# Patient Record
Sex: FEMALE | Race: WHITE | NOT HISPANIC OR LATINO | Employment: FULL TIME | URBAN - METROPOLITAN AREA
[De-identification: names, ages, dates, MRNs, and addresses within clinical notes are randomized per-mention and may not be internally consistent; named-entity substitution may affect disease eponyms.]

---

## 2017-10-30 ENCOUNTER — CONVERSION ENCOUNTER (OUTPATIENT)
Dept: GENERAL RADIOLOGY | Facility: HOSPITAL | Age: 52
End: 2017-10-30

## 2018-09-28 ENCOUNTER — OFFICE VISIT CONVERTED (OUTPATIENT)
Dept: FAMILY MEDICINE CLINIC | Facility: CLINIC | Age: 53
End: 2018-09-28
Attending: PHYSICIAN ASSISTANT

## 2019-09-23 ENCOUNTER — HOSPITAL ENCOUNTER (OUTPATIENT)
Dept: LAB | Facility: HOSPITAL | Age: 54
Discharge: HOME | End: 2019-09-23
Attending: PHYSICIAN ASSISTANT

## 2019-09-23 LAB
25(OH)D3 SERPL-MCNC: 30.4 NG/ML (ref 30–100)
ALBUMIN SERPL-MCNC: 4.5 G/DL (ref 3.5–5)
ALBUMIN/GLOB SERPL: 1.7 {RATIO} (ref 1.4–2.6)
ALP SERPL-CCNC: 82 U/L (ref 53–141)
ALT SERPL-CCNC: 16 U/L (ref 10–40)
ANION GAP SERPL CALC-SCNC: 22 MMOL/L (ref 8–19)
AST SERPL-CCNC: 18 U/L (ref 15–50)
BASOPHILS # BLD AUTO: 0.11 10*3/UL (ref 0–0.2)
BASOPHILS NFR BLD AUTO: 1.2 % (ref 0–3)
BILIRUB SERPL-MCNC: 0.3 MG/DL (ref 0.2–1.3)
BUN SERPL-MCNC: 9 MG/DL (ref 5–25)
BUN/CREAT SERPL: 10 {RATIO} (ref 6–20)
CALCIUM SERPL-MCNC: 8.7 MG/DL (ref 8.7–10.4)
CHLORIDE SERPL-SCNC: 100 MMOL/L (ref 99–111)
CHOLEST SERPL-MCNC: 219 MG/DL (ref 107–200)
CHOLEST/HDLC SERPL: 6.4 {RATIO} (ref 3–6)
CONV ABS IMM GRAN: 0.02 10*3/UL (ref 0–0.2)
CONV CO2: 24 MMOL/L (ref 22–32)
CONV IMMATURE GRAN: 0.2 % (ref 0–1.8)
CONV TOTAL PROTEIN: 7.2 G/DL (ref 6.3–8.2)
CREAT UR-MCNC: 0.86 MG/DL (ref 0.5–0.9)
DEPRECATED RDW RBC AUTO: 44.2 FL (ref 36.4–46.3)
EOSINOPHIL # BLD AUTO: 0.27 10*3/UL (ref 0–0.7)
EOSINOPHIL # BLD AUTO: 3 % (ref 0–7)
ERYTHROCYTE [DISTWIDTH] IN BLOOD BY AUTOMATED COUNT: 12.3 % (ref 11.7–14.4)
GFR SERPLBLD BASED ON 1.73 SQ M-ARVRAT: >60 ML/MIN/{1.73_M2}
GLOBULIN UR ELPH-MCNC: 2.7 G/DL (ref 2–3.5)
GLUCOSE SERPL-MCNC: 105 MG/DL (ref 65–99)
HCT VFR BLD AUTO: 41.6 % (ref 37–47)
HDLC SERPL-MCNC: 34 MG/DL (ref 40–60)
HGB BLD-MCNC: 13.8 G/DL (ref 12–16)
LDLC SERPL CALC-MCNC: 108 MG/DL (ref 70–100)
LYMPHOCYTES # BLD AUTO: 3.05 10*3/UL (ref 1–5)
LYMPHOCYTES NFR BLD AUTO: 33.6 % (ref 20–45)
MCH RBC QN AUTO: 32.3 PG (ref 27–31)
MCHC RBC AUTO-ENTMCNC: 33.2 G/DL (ref 33–37)
MCV RBC AUTO: 97.4 FL (ref 81–99)
MONOCYTES # BLD AUTO: 0.63 10*3/UL (ref 0.2–1.2)
MONOCYTES NFR BLD AUTO: 6.9 % (ref 3–10)
NEUTROPHILS # BLD AUTO: 5 10*3/UL (ref 2–8)
NEUTROPHILS NFR BLD AUTO: 55.1 % (ref 30–85)
NRBC CBCN: 0 % (ref 0–0.7)
OSMOLALITY SERPL CALC.SUM OF ELEC: 293 MOSM/KG (ref 273–304)
PLATELET # BLD AUTO: 286 10*3/UL (ref 130–400)
PMV BLD AUTO: 9.8 FL (ref 9.4–12.3)
POTASSIUM SERPL-SCNC: 3.5 MMOL/L (ref 3.5–5.3)
RBC # BLD AUTO: 4.27 10*6/UL (ref 4.2–5.4)
SODIUM SERPL-SCNC: 142 MMOL/L (ref 135–147)
T4 FREE SERPL-MCNC: 1.2 NG/DL (ref 0.9–1.8)
TRIGL SERPL-MCNC: 387 MG/DL (ref 40–150)
TSH SERPL-ACNC: 3.74 M[IU]/L (ref 0.27–4.2)
VLDLC SERPL-MCNC: 77 MG/DL (ref 5–37)
WBC # BLD AUTO: 9.08 10*3/UL (ref 4.8–10.8)

## 2019-09-30 ENCOUNTER — OFFICE VISIT CONVERTED (OUTPATIENT)
Dept: FAMILY MEDICINE CLINIC | Facility: CLINIC | Age: 54
End: 2019-09-30
Attending: PHYSICIAN ASSISTANT

## 2019-11-08 ENCOUNTER — HOSPITAL ENCOUNTER (OUTPATIENT)
Dept: GENERAL RADIOLOGY | Facility: HOSPITAL | Age: 54
Discharge: HOME | End: 2019-11-08
Attending: PHYSICIAN ASSISTANT

## 2020-03-26 ENCOUNTER — CONVERSION ENCOUNTER (OUTPATIENT)
Dept: FAMILY MEDICINE CLINIC | Facility: CLINIC | Age: 55
End: 2020-03-26

## 2020-03-26 ENCOUNTER — OFFICE VISIT CONVERTED (OUTPATIENT)
Dept: FAMILY MEDICINE CLINIC | Facility: CLINIC | Age: 55
End: 2020-03-26
Attending: NURSE PRACTITIONER

## 2020-04-02 ENCOUNTER — OFFICE VISIT CONVERTED (OUTPATIENT)
Dept: FAMILY MEDICINE CLINIC | Facility: CLINIC | Age: 55
End: 2020-04-02
Attending: NURSE PRACTITIONER

## 2020-08-10 ENCOUNTER — TELEMEDICINE CONVERTED (OUTPATIENT)
Dept: FAMILY MEDICINE CLINIC | Facility: CLINIC | Age: 55
End: 2020-08-10
Attending: PHYSICIAN ASSISTANT

## 2020-08-24 ENCOUNTER — TELEMEDICINE CONVERTED (OUTPATIENT)
Dept: FAMILY MEDICINE CLINIC | Facility: CLINIC | Age: 55
End: 2020-08-24
Attending: PHYSICIAN ASSISTANT

## 2021-02-25 ENCOUNTER — TELEMEDICINE CONVERTED (OUTPATIENT)
Dept: FAMILY MEDICINE CLINIC | Facility: CLINIC | Age: 56
End: 2021-02-25
Attending: PHYSICIAN ASSISTANT

## 2021-03-05 ENCOUNTER — HOSPITAL ENCOUNTER (OUTPATIENT)
Dept: LAB | Facility: HOSPITAL | Age: 56
Discharge: HOME | End: 2021-03-05
Attending: PHYSICIAN ASSISTANT

## 2021-03-05 LAB
25(OH)D3 SERPL-MCNC: 44.5 NG/ML (ref 30–100)
ALBUMIN SERPL-MCNC: 4.4 G/DL (ref 3.5–5)
ALBUMIN/GLOB SERPL: 2 {RATIO} (ref 1.4–2.6)
ALP SERPL-CCNC: 75 U/L (ref 53–141)
ALT SERPL-CCNC: 18 U/L (ref 10–40)
ANION GAP SERPL CALC-SCNC: 19 MMOL/L (ref 8–19)
APPEARANCE UR: ABNORMAL
AST SERPL-CCNC: 16 U/L (ref 15–50)
BASOPHILS # BLD AUTO: 0.11 10*3/UL (ref 0–0.2)
BASOPHILS NFR BLD AUTO: 1.2 % (ref 0–3)
BILIRUB SERPL-MCNC: 0.24 MG/DL (ref 0.2–1.3)
BILIRUB UR QL: NEGATIVE
BUN SERPL-MCNC: 10 MG/DL (ref 5–25)
BUN/CREAT SERPL: 11 {RATIO} (ref 6–20)
CALCIUM SERPL-MCNC: 9.1 MG/DL (ref 8.7–10.4)
CHLORIDE SERPL-SCNC: 102 MMOL/L (ref 99–111)
CHOLEST SERPL-MCNC: 265 MG/DL (ref 107–200)
CHOLEST/HDLC SERPL: 7 {RATIO} (ref 3–6)
COLOR UR: YELLOW
CONV ABS IMM GRAN: 0.03 10*3/UL (ref 0–0.2)
CONV BACTERIA: ABNORMAL
CONV CO2: 27 MMOL/L (ref 22–32)
CONV COLLECTION SOURCE (UA): ABNORMAL
CONV HYALINE CASTS IN URINE MICRO: ABNORMAL /[LPF]
CONV IMMATURE GRAN: 0.3 % (ref 0–1.8)
CONV TOTAL PROTEIN: 6.6 G/DL (ref 6.3–8.2)
CONV UROBILINOGEN IN URINE BY AUTOMATED TEST STRIP: 0.2 {EHRLICHU}/DL (ref 0.1–1)
CREAT UR-MCNC: 0.9 MG/DL (ref 0.5–0.9)
DEPRECATED RDW RBC AUTO: 45.3 FL (ref 36.4–46.3)
EOSINOPHIL # BLD AUTO: 0.42 10*3/UL (ref 0–0.7)
EOSINOPHIL # BLD AUTO: 4.4 % (ref 0–7)
ERYTHROCYTE [DISTWIDTH] IN BLOOD BY AUTOMATED COUNT: 12.4 % (ref 11.7–14.4)
GFR SERPLBLD BASED ON 1.73 SQ M-ARVRAT: >60 ML/MIN/{1.73_M2}
GLOBULIN UR ELPH-MCNC: 2.2 G/DL (ref 2–3.5)
GLUCOSE SERPL-MCNC: 92 MG/DL (ref 65–99)
GLUCOSE UR QL: NEGATIVE MG/DL
HCT VFR BLD AUTO: 45.6 % (ref 37–47)
HDLC SERPL-MCNC: 38 MG/DL (ref 40–60)
HGB BLD-MCNC: 14.8 G/DL (ref 12–16)
HGB UR QL STRIP: NEGATIVE
KETONES UR QL STRIP: NEGATIVE MG/DL
LDLC SERPL CALC-MCNC: 156 MG/DL (ref 70–100)
LEUKOCYTE ESTERASE UR QL STRIP: NEGATIVE
LYMPHOCYTES # BLD AUTO: 3.74 10*3/UL (ref 1–5)
LYMPHOCYTES NFR BLD AUTO: 39.1 % (ref 20–45)
MCH RBC QN AUTO: 32.2 PG (ref 27–31)
MCHC RBC AUTO-ENTMCNC: 32.5 G/DL (ref 33–37)
MCV RBC AUTO: 99.1 FL (ref 81–99)
MONOCYTES # BLD AUTO: 0.85 10*3/UL (ref 0.2–1.2)
MONOCYTES NFR BLD AUTO: 8.9 % (ref 3–10)
NEUTROPHILS # BLD AUTO: 4.41 10*3/UL (ref 2–8)
NEUTROPHILS NFR BLD AUTO: 46.1 % (ref 30–85)
NITRITE UR QL STRIP: NEGATIVE
NRBC CBCN: 0 % (ref 0–0.7)
OSMOLALITY SERPL CALC.SUM OF ELEC: 297 MOSM/KG (ref 273–304)
PH UR STRIP.AUTO: 5.5 [PH] (ref 5–8)
PLATELET # BLD AUTO: 316 10*3/UL (ref 130–400)
PMV BLD AUTO: 9.7 FL (ref 9.4–12.3)
POTASSIUM SERPL-SCNC: 3.6 MMOL/L (ref 3.5–5.3)
PROT UR QL: NEGATIVE MG/DL
RBC # BLD AUTO: 4.6 10*6/UL (ref 4.2–5.4)
RBC #/AREA URNS HPF: ABNORMAL /[HPF]
SODIUM SERPL-SCNC: 144 MMOL/L (ref 135–147)
SP GR UR: 1.02 (ref 1–1.03)
SQUAMOUS SPT QL MICRO: ABNORMAL /[HPF]
T4 FREE SERPL-MCNC: 0.9 NG/DL (ref 0.9–1.8)
TRIGL SERPL-MCNC: 356 MG/DL (ref 40–150)
TSH SERPL-ACNC: 8.46 M[IU]/L (ref 0.27–4.2)
VLDLC SERPL-MCNC: 71 MG/DL (ref 5–37)
WBC # BLD AUTO: 9.56 10*3/UL (ref 4.8–10.8)
WBC #/AREA URNS HPF: ABNORMAL /[HPF]

## 2021-05-07 ENCOUNTER — OFFICE VISIT CONVERTED (OUTPATIENT)
Dept: FAMILY MEDICINE CLINIC | Facility: CLINIC | Age: 56
End: 2021-05-07
Attending: PHYSICIAN ASSISTANT

## 2021-05-07 ENCOUNTER — CONVERSION ENCOUNTER (OUTPATIENT)
Dept: FAMILY MEDICINE CLINIC | Facility: CLINIC | Age: 56
End: 2021-05-07

## 2021-05-12 NOTE — PROGRESS NOTES
"   Progress Note      Patient Name: Fabiana Wright   Patient ID: [id number]   Sex: Female   YOB: 1965    Primary Care Provider: Joshua Head PA-C   Referring Provider: Joshua Head PA-C    Visit Date: April 2, 2020    Provider: EBER Quinones   Location: [facility]   Location Address: [address] [place] [address], [address]  [place], [place]  [id number]   Location Phone: [phone]          Chief Complaint  · sinus pressure  · cough     she is still coughing and she feels like she is having a lot of sinus pressure. She cannot smell or taste anything.       History Of Present Illness  Fabiana Wright is a 54 year old /White female who presents for evaluation and treatment of:      she was seen on 3/11 at acute care diagnosed with sinus and given augmentin then she was given erythromycin and Medrol pack after that she states she feels the same and is \"no better\".  Her chests hurts and throat is scratchy her cough is prod she denies asthma she wears a CPAP at night she feels fine all night it is in the am that her chest starts to hurt s and her hoarseness begins and prod cough she denies fever or soa.  She states she is having a lot of sinus pressure and era pressure caitlin left and she cannot smell or taste anything       Past Medical History  Disease Name Date Onset Notes   Anxiety --  --    Cervical radiculitis 01/16/2014 --    Chest pain --  --    Depression --  --    Hyperlipidemia 09/27/2016 --    Hyperlipidemia 11/6/09 --    Hypothyroidism 04/01/2015 --    Lumbar disc w/o myelopathy 01/16/2014 --    Lumbar radiculitis 01/16/2014 --    Migraine Headaches --  --    Muscle Spasm 1/21/10 --    Nicotine dependence 09/27/2016 --    Screening mammogram, encounter for 11/08/2019 11/08/2019 - normal, repeat in one year   Thyroid Gland Neoplasm, Malignant 11/3/09 --    Vitamin D deficiency 09/28/2018 --          Past Surgical History  Procedure Name Date Notes   Hysterectomy 2011 Complete "   Thyroidectomy, Total 1/21/10 --    Tubal ligation --  --          Medication List  Name Date Started Instructions   atorvastatin 10 mg oral tablet 2019 take 1 tablet (10 mg) by oral route once daily at bedtime for 90 days   Calcium 600 600 mg (1,500 mg) oral tablet  take 1 tablet by oral route daily   fluoxetine 20 mg oral capsule 2019 TAKE 1 CAPSULE BY MOUTH ONCE DAILY   levothyroxine 112 mcg oral tablet 2019 TAKE 1 TABLET BY MOUTH ONCE DAILY   Mobic 7.5 mg oral tablet 2019 take 1 tablet (7.5 mg) by oral route once daily for 90 days with food for arthritis   Prozac 20 mg oral capsule 2019 take 1 capsule (20 mg) by oral route once daily for 90 days   Vitamin D2 50,000 unit oral capsule 2019 take 1 capsule (50,000 unit) by oral route once weekly for 90 days         Allergy List  Allergen Name Date Reaction Notes   NO KNOWN DRUG ALLERGIES --  --  --          Family Medical History  Disease Name Relative/Age Notes   Heart Disease  --    Cancer, Unspecified  --          Reproductive History  Menstrual   Menopause Status: Postmenopausal HRT?: No   Pregnancy Summary   Total Pregnancies: 1 Full Term: 1 Premature: 0   Ab Induced: 0 Ab Spontaneous: 0 Ectopics: 0   Multiples: 0 Livin         Social History  Finding Status Start/Stop Quantity Notes   Active but no formal exercise --  --/-- --  --    Alcohol Light --/-- --  --     --  --/-- --  --    No known infection risk --  --/-- --  --    Tobacco Current every day --/-- 1/2 PPD --          Review of Systems  · Constitutional  o Admits  o : m/f  o Denies  o : fever,   · Eyes  o Denies  o : diplopia, recent changes, blurred vision, redness of eye, eye pain, discharge from eye  · HENT  o Admits  o : sore throat, sinus pressure/drainage, ear pain  · Cardiovascular  o Denies  o : palpitation, chest pain, claudication, pedal edema  · Respiratory  o Admits  o : cough spastic and prod  o Denies  o : shortness of breath, GARCIA,    · Gastrointestinal  o Denies  o : nausea, vomiting, reflux, diarrhea, abdominal pain,  · Genitourinary  o Denies  o : frequency, urgency, dysuria, vaginal discharge  · Neurologic  o Denies  o : unsteady gait, weakness, dizziness, H/A  · Musculoskeletal  o Denies  o : myalgias, joint pain, joint swelling  · Allergic-Immunologic  o Denies  o : bees, frequent illnesses, seasonal allergies      Vitals  Date Time BP Position Site L\R Cuff Size HR RR TEMP (F) WT  HT  BMI kg/m2 BSA m2 O2 Sat HC       04/02/2020 02:26 /70 Sitting    85 - R  97.5     97 %          Physical Examination  · Constitutional  o Appearance  o : well-nourished, well developed, alert, in no acute distress  · Head and Face  o Face  o :   § Palpation  § : no sinus tenderness on palpation, decreased transillumination   · Ears, Nose, Mouth and Throat  o Ears  o :   § External Ears  § : appearance within normal limits, no lesions present  § Otoscopic Examination  § : tympanic membrane appearance reddened on the left  o Nose  o :   § External Nose  § : normal stucture noted.  o Oral Cavity  o :   § Oral Mucosa  § : oral mucosa normal without pallor or cyanosis  § Lips  § : lip appearance normal  § Teeth  § : normal dentition for age  § Gums  § : gums pink, non-swollen, no bleeding present  § Tongue  § : tongue appearance normal  § Palate  § : hard palate normal, soft palate appearance normal  o Throat  o :   § Oropharynx  § : oropharynx inflammation present, tonsils within normal limits  · Neck  o Thyroid  o : gland size normal, nontender, no nodules or masses present on palpation, symmetric  · Respiratory  o Respiratory Effort  o : breathing unlabored  o Auscultation of Lungs  o : normal breath sounds throughout  · Cardiovascular  o Heart  o :   § Auscultation of Heart  § : regular rate and rhythm, no murmurs, gallops or rubs  § Palpation of Heart  § : normal apical impulse, no cardiac thrill present  o Peripheral Vascular System  o :   § Carotid  Arteries  § : normal pulses bilaterally, no bruits present  § Extremities  § : no cyanosis, clubbing or edema; less than 2 second refill noted  · Skin and Subcutaneous Tissue  o General Inspection  o : no rashes or lesions present, no areas of discoloration  · Neurologic  o Mental Status Examination  o :   § Orientation  § : grossly oriented to person, place and time  o Cranial Nerves  o : cranial nerves intact and symmetric throughout  · Psychiatric  o Mood and Affect  o : mood normal, affect appropriate, denies any SI/HI              Assessment  · Bronchitis, acute     466.0/J20.9  · Cough     786.2/R05  · Sinusitis, acute     461.9/J01.90  · Sinus pressure     478.19/J34.89  · Otitis media     382.9/H66.90      Plan  · Orders  o ACO-39: Current medications updated and reviewed () - - 04/02/2020  · Medications  o Augmentin 875-125 mg oral tablet   SIG: take 1 tablet by oral route every 12 hours for 10 days   DISP: (20) tablets with 0 refills  Prescribed on 04/02/2020     o Medrol (Michael) 4 mg oral tablets,dose pack   SIG: take by oral route as directed per package instructions for 6 days   DISP: (1) 21 ct dose-pack with 0 refills  Prescribed on 04/02/2020     o Medications have been Reconciled  o Transition of Care or Provider Policy  · Instructions  o Handouts were given to patient:   o Patient is taking medications as prescribed and doing well.   o Take all medications as prescribed/directed.  o Patient was educated/instructed on their diagnosis, treatment and medications prior to discharge from the clinic today.  o Patient instructed to seek medical attention urgently for new or worsening symptoms.  o Call the office with any concerns or questions.  · Disposition  o Call or Return if symptoms worsen or persist.  o follow up prn or as needed  o Care Transition            Electronically Signed by: Teresa Gonzalez APRN -Author on April 6, 2020 01:38:05 PM

## 2021-05-13 ENCOUNTER — HOSPITAL ENCOUNTER (OUTPATIENT)
Dept: CARDIOLOGY | Facility: HOSPITAL | Age: 56
Discharge: HOME | End: 2021-05-13
Attending: PHYSICIAN ASSISTANT

## 2021-05-13 NOTE — PROGRESS NOTES
Progress Note      Patient Name: Fabiana Wright   Patient ID: [id number]   Sex: Female   YOB: 1965    Primary Care Provider: Joshua Head PA-C   Referring Provider: Joshua Heda PA-C    Visit Date: August 10, 2020    Provider: Joshua Head PA-C   Location: [facility]   Location Address: [address] [place] [address], [address]  [place], [place]  [id number]   Location Phone: [phone]          Chief Complaint  · Abdominal irritation  · Vomiting      History Of Present Illness  Video Conferencing Visit  Fabiana Wright is a 54 year old /White female who is presenting for evaluation via video conferencing via [employer]. Verbal consent obtained before beginning visit.   The following staff were present during this visit: EDDA Rodriguez   Fabiana Wright is a 54 year old /White female who presents for evaluation and treatment of:      abdominal pain/irritation    Pt states that she has had an irritated stomach since last Thursday (5 days)    She c/ Nausea and vomiting.  Recurrent    Denies Fever, ear pain, headache or diarrhea    No other complaints at this time    Currently using Pepto Bismol w/o relief    Pt has lots of anxiety, sick at her stomach.  N/V, sick to stomach.  She was forced to take a position at work at it has worsened since then.    Pt smokes and not ready to quit       Past Medical History  Disease Name Date Onset Notes   Anxiety --  --    Cervical radiculitis 01/16/2014 --    Chest pain --  --    Depression --  --    Hyperlipidemia 09/27/2016 --    Hyperlipidemia 11/6/09 --    Hypothyroidism 04/01/2015 --    Lumbar disc w/o myelopathy 01/16/2014 --    Lumbar radiculitis 01/16/2014 --    Migraine Headaches --  --    Muscle Spasm 1/21/10 --    Nicotine dependence 09/27/2016 --    Screening mammogram, encounter for 11/08/2019 11/08/2019 - normal, repeat in one year   Thyroid Gland Neoplasm, Malignant 11/3/09 --    Vitamin D deficiency 09/28/2018 --          Past  Surgical History  Procedure Name Date Notes   Hysterectomy  Complete   Thyroidectomy, Total 1/21/10 --    Tubal ligation --  --          Medication List  Name Date Started Instructions   atorvastatin 10 mg oral tablet 2019 take 1 tablet (10 mg) by oral route once daily at bedtime for 90 days   Calcium 600 600 mg (1,500 mg) oral tablet  take 1 tablet by oral route daily   fluoxetine 40 mg oral capsule 08/10/2020 take 1 capsule (40 mg) by oral route once daily for 30 days   levothyroxine 112 mcg oral tablet 2019 TAKE 1 TABLET BY MOUTH ONCE DAILY   Mobic 7.5 mg oral tablet 2019 take 1 tablet (7.5 mg) by oral route once daily for 90 days with food for arthritis   Prozac 20 mg oral capsule 2019 take 1 capsule (20 mg) by oral route once daily for 90 days   Vitamin D2 50,000 unit oral capsule 2019 take 1 capsule (50,000 unit) by oral route once weekly for 90 days         Allergy List  Allergen Name Date Reaction Notes   NO KNOWN DRUG ALLERGIES --  --  --        Allergies Reconciled  Family Medical History  Disease Name Relative/Age Notes   Heart Disease  --    Cancer, Unspecified  --          Reproductive History  Menstrual   Menopause Status: Postmenopausal HRT?: No   Pregnancy Summary   Total Pregnancies: 1 Full Term: 1 Premature: 0   Ab Induced: 0 Ab Spontaneous: 0 Ectopics: 0   Multiples: 0 Livin         Social History  Finding Status Start/Stop Quantity Notes   Active but no formal exercise --  --/-- --  --    Alcohol Light --/-- --  --     --  --/-- --  --    No known infection risk --  --/-- --  --    Tobacco Current every day --/-- 1/2 PPD --          Review of Systems  · Constitutional  o Denies  o : fever, fatigue, weight loss, weight gain  · Cardiovascular  o Denies  o : lower extremity edema, claudication, chest pressure, palpitations  · Respiratory  o Denies  o : shortness of breath, wheezing, cough, hemoptysis, dyspnea on exertion  · Gastrointestinal  o Admits  o :  nausea, vomiting  o Denies  o : diarrhea, constipation, abdominal pain      Physical Examination  · Constitutional  o Appearance  o : well-nourished, no acute distress  · Respiratory  o Respiratory Effort  o : breathing comfortably, no cough  · Skin and Subcutaneous Tissue  o General Inspection  o : no visible rash, no lesions  · Neurologic  o Mental Status Examination  o :   § Orientation  § : grossly oriented to person, place and time, no facial droop          Assessment  · Anxiety disorder     300.00/F41.9  · Nicotine dependence     305.1/F17.200  · Nausea & vomiting     787.01/R11.2  · Gastritis     535.50/K29.70      Plan  · Orders  o ACO-17: Screened for tobacco use AND received tobacco cessation intervention (4004F) - 305.1/F17.200 - 08/10/2020  o ACO-39: Current medications updated and reviewed () - - 08/10/2020  · Medications  o Protonix 40 mg oral tablet,delayed release (DR/EC)   SIG: take 1 tablet (40 mg) by oral route once daily for 30 days   DISP: (30) tablets with 2 refills  Prescribed on 08/10/2020     o fluoxetine 40 mg oral capsule   SIG: take 1 capsule (40 mg) by oral route once daily for 30 days   DISP: (30) capsules with 2 refills  Adjusted on 08/10/2020     o Augmentin 875-125 mg oral tablet   SIG: take 1 tablet by oral route every 12 hours for 10 days   DISP: (20) tablets with 0 refills  Discontinued on 08/10/2020     o Medrol (Michael) 4 mg oral tablets,dose pack   SIG: take by oral route as directed per package instructions for 6 days   DISP: (1) 21 ct dose-pack with 0 refills  Discontinued on 08/10/2020     o Medications have been Reconciled  o Transition of Care or Provider Policy  · Instructions  o *Form of nicotine being used: cigs  o Patient was strongly encouraged to discontinue use of any nicotine containing product or minimize the use of the product.  o Patient was educated/instructed on their diagnosis, treatment and medications prior to discharge from the clinic today.  o Patient  counseled to stop smoking.  o Discussed Covid-19 precautions including, but not limited to, social distancing, avoid touching your face, and hand washing.   · Disposition  o Call or Return if symptoms worsen or persist.  o F/U 2 weeks  o Care Transition            Electronically Signed by: Joshua Head PA-C -Author on August 10, 2020 09:56:12 AM

## 2021-05-13 NOTE — PROGRESS NOTES
Progress Note      Patient Name: Fabiana Wright   Patient ID: [id number]   Sex: Female   YOB: 1965    Primary Care Provider: Joshua Head PA-C   Referring Provider: Joshua Head PA-C    Visit Date: August 24, 2020    Provider: Joshua Head PA-C   Location: [facility]   Location Address: [address] [place] [address], [address]  [place], [place]  [id number]   Location Phone: [phone]          Chief Complaint  · 2 week follow up on abd pain      History Of Present Illness  Video Conferencing Visit  Fabiana Wright is a 54 year old /White female who is presenting for evaluation via video conferencing via [employer]. Verbal consent obtained before beginning visit.   The following staff were present during this visit: Nimo Sheridan CMA/Joshua Head PA-C   Fabiana Wright is a 54 year old /White female who presents for evaluation and treatment of: 2 week follow up on abd pain.      pt presents today for 2 week follow up for abd pain.    pt states she is not having any issues with abd pain; is feeling much better.    Labs 9/19    no refills needed today    Pt is unhappy at work.  Patient is feeling better using the 40 mg of fluoxetine from the 20.  She states that she is very unhappy with her current work environment she states she has hired an  to investigate a promotion situation that occurred at her job.       Past Medical History  Disease Name Date Onset Notes   Anxiety --  --    Cervical radiculitis 01/16/2014 --    Chest pain --  --    Depression --  --    Hyperlipidemia 09/27/2016 --    Hyperlipidemia 11/6/09 --    Hypothyroidism 04/01/2015 --    Lumbar disc w/o myelopathy 01/16/2014 --    Lumbar radiculitis 01/16/2014 --    Migraine Headaches --  --    Muscle Spasm 1/21/10 --    Nicotine dependence 09/27/2016 --    Screening mammogram, encounter for 11/08/2019 11/08/2019 - normal, repeat in one year   Thyroid Gland Neoplasm, Malignant 11/3/09 --    Vitamin D deficiency  2018 --          Past Surgical History  Procedure Name Date Notes   Hysterectomy 2011 Complete   Thyroidectomy, Total 1/21/10 --    Tubal ligation --  --          Medication List  Name Date Started Instructions   atorvastatin 10 mg oral tablet 2019 take 1 tablet (10 mg) by oral route once daily at bedtime for 90 days   Calcium 600 600 mg (1,500 mg) oral tablet  take 1 tablet by oral route daily   fluoxetine 40 mg oral capsule 08/10/2020 take 1 capsule (40 mg) by oral route once daily for 30 days   levothyroxine 112 mcg oral tablet 2019 TAKE 1 TABLET BY MOUTH ONCE DAILY   Mobic 7.5 mg oral tablet 2019 take 1 tablet (7.5 mg) by oral route once daily for 90 days with food for arthritis   Protonix 40 mg oral tablet,delayed release (DR/EC) 08/10/2020 take 1 tablet (40 mg) by oral route once daily for 30 days   Prozac 20 mg oral capsule 2019 take 1 capsule (20 mg) by oral route once daily for 90 days   Vitamin D2 50,000 unit oral capsule 2019 take 1 capsule (50,000 unit) by oral route once weekly for 90 days         Allergy List  Allergen Name Date Reaction Notes   NO KNOWN DRUG ALLERGIES --  --  --          Family Medical History  Disease Name Relative/Age Notes   Heart Disease  --    Cancer, Unspecified  --          Reproductive History  Menstrual   Menopause Status: Postmenopausal HRT?: No   Pregnancy Summary   Total Pregnancies: 1 Full Term: 1 Premature: 0   Ab Induced: 0 Ab Spontaneous: 0 Ectopics: 0   Multiples: 0 Livin         Social History  Finding Status Start/Stop Quantity Notes   Active but no formal exercise --  --/-- --  --    Alcohol Light --/-- --  --     --  --/-- --  --    No known infection risk --  --/-- --  --    Tobacco Current every day --/-- 1/2 PPD --          Review of Systems  · Constitutional  o Denies  o : fever, fatigue, weight loss, weight gain  · Cardiovascular  o Denies  o : lower extremity edema, claudication, chest pressure,  palpitations  · Respiratory  o Denies  o : shortness of breath, wheezing, cough, hemoptysis, dyspnea on exertion  · Gastrointestinal  o Denies  o : nausea, vomiting, diarrhea, constipation, abdominal pain      Physical Examination  · Constitutional  o Appearance  o : well-nourished, no acute distress  · Respiratory  o Respiratory Effort  o : breathing comfortably, no cough  · Skin and Subcutaneous Tissue  o General Inspection  o : no visible rash, no lesions  · Neurologic  o Mental Status Examination  o :   § Orientation  § : grossly oriented to person, place and time, no facial droop          Assessment  · Anxiety disorder     300.00/F41.9  · Depression     311/F32.9  · Hyperlipidemia     272.4/E78.5  · Nicotine dependence     305.1/F17.200      Plan  · Orders  o ACO-17: Screened for tobacco use AND received tobacco cessation intervention (4004F) - 305.1/F17.200 - 08/24/2020  o ACO-39: Current medications updated and reviewed () - - 08/24/2020  o ACO-14: Influenza immunization was not administered for reasons documented () - - 08/24/2020  o ACO-20: Screening Mammography documented and reviewed () - - 08/24/2020  · Medications  o fluoxetine 40 mg oral capsule   SIG: take 1 capsule (40 mg) by oral route once daily for 90 days   DISP: (90) capsules with 3 refills  Refilled on 08/24/2020     o Prozac 20 mg oral capsule   SIG: take 1 capsule (20 mg) by oral route once daily for 90 days   DISP: (90) capsules with 3 refills  Discontinued on 08/24/2020     o Medications have been Reconciled  o Transition of Care or Provider Policy  · Instructions  o Patient was educated and given low cholesterol diet information.  o Recommended exercise program to assist with cholesterol, weight loss and overall health improvement.  o Advised that cheeses and other sources of dairy fats, animal fats, fast food, and the extras (candy, pastries, pies, doughnuts and cookies) all contain LDL raising nutrients. Advised to  increase fruits, vegetables, whole grains, and to monitor portion sizes.   o *Form of nicotine being used: cigs  o Patient was strongly encouraged to discontinue use of any nicotine containing product or minimize the use of the product.  o Take all medications as prescribed/directed.  o Patient was educated/instructed on their diagnosis, treatment and medications prior to discharge from the clinic today.  o Patient counseled to stop smoking.  o Discussed Covid-19 precautions including, but not limited to, social distancing, avoid touching your face, and hand washing.   · Disposition  o Call or Return if symptoms worsen or persist.  o F/U in 4-6 months  o Care Transition            Electronically Signed by: Joshua Head PA-C -Author on August 24, 2020 08:13:36 AM

## 2021-05-14 NOTE — PROGRESS NOTES
Progress Note      Patient Name: Fabiana Wright   Patient ID: [id number]   Sex: Female   YOB: 1965    Primary Care Provider: Joshua Head PA-C   Referring Provider: Joshua Head PA-C    Visit Date: February 25, 2021    Provider: Joshua Head PA-C   Location: [facility] [place]   Location Address: [address] [place] [address], [address]  [place], [place]  [id number]   Location Phone: [phone]          Chief Complaint  · 6 month follow up  · (R) hand/finger numbness      History Of Present Illness  Video Conferencing Visit  Fabiana Wright is a 55 year old /White female who is presenting for evaluation via video conferencing via [employer]. Verbal consent obtained before beginning visit.   The following staff were present during this visit: Doug Jackson MA/Joshua Head PA-C   Fabiana Wright is a 55 year old /White female who presents for evaluation and treatment of: 6 month follow up, (R) hand/finger numbness      Pt presents via [employer] for a 6 month follow up.     Pt is requesting labs to check thyroid.     Pt c/o numbness/tingling in (R) hand/fingers for 3 weeks. Pt notes she notices this when she lays down, she will have sharp pain then numbness/tingling.   Worker's comp she thinks, she will discuss with her employer    Pt continues to smoke, she has cut back but is not ready to quit.     Labs-9/2019  Mammo-10/2019  Colonoscopy-refused  Flu-refused    No CP, SOA, HA    Pt wants to decrease Prozac dosage       Past Medical History  Disease Name Date Onset Notes   Anxiety --  --    Cervical radiculitis 01/16/2014 --    Chest pain --  --    Depression --  --    Hyperlipidemia 09/27/2016 --    Hyperlipidemia 11/6/09 --    Hypothyroidism 04/01/2015 --    Lumbar disc w/o myelopathy 01/16/2014 --    Lumbar radiculitis 01/16/2014 --    Migraine Headaches --  --    Muscle Spasm 1/21/10 --    Nicotine dependence 09/27/2016 --    Screening mammogram, encounter for 11/08/2019  2019 - normal, repeat in one year   Thyroid Gland Neoplasm, Malignant 11/3/09 --    Vitamin D deficiency 2018 --          Past Surgical History  Procedure Name Date Notes   Hysterectomy  Complete   Thyroidectomy, Total 1/21/10 --    Tubal ligation --  --          Medication List  Name Date Started Instructions   Calcium 600 600 mg (1,500 mg) oral tablet  take 1 tablet by oral route daily   Euthyrox 112 mcg oral tablet 2021 Take 1 tablet by mouth once daily   fluoxetine 20 mg oral capsule 2021 take 1 capsule (20 mg) by oral route once daily for 90 days   Mobic 7.5 mg oral tablet 2021 take 1 tablet (7.5 mg) by oral route once daily for 90 days with food for arthritis         Allergy List  Allergen Name Date Reaction Notes   NO KNOWN DRUG ALLERGIES --  --  --        Allergies Reconciled  Family Medical History  Disease Name Relative/Age Notes   Heart Disease  --    Cancer, Unspecified  --          Reproductive History  Menstrual   Menopause Status: Postmenopausal HRT?: No   Pregnancy Summary   Total Pregnancies: 1 Full Term: 1 Premature: 0   Ab Induced: 0 Ab Spontaneous: 0 Ectopics: 0   Multiples: 0 Livin         Social History  Finding Status Start/Stop Quantity Notes   Active but no formal exercise --  --/-- --  --    Alcohol Light --/-- --  --     --  --/-- --  --    No known infection risk --  --/-- --  --    Tobacco Current every day --/-- 1/2 PPD --          Review of Systems  · Constitutional  o Denies  o : fever, fatigue, weight loss, weight gain  · Cardiovascular  o Denies  o : lower extremity edema, claudication, chest pressure, palpitations  · Respiratory  o Denies  o : shortness of breath, wheezing, cough, hemoptysis, dyspnea on exertion  · Gastrointestinal  o Denies  o : nausea, vomiting, diarrhea, constipation, abdominal pain      Physical Examination  · Constitutional  o Appearance  o : well-nourished, no acute distress  · Head and Face  o Head  o :    § Inspection  § : atraumatic, normocephalic  · Respiratory  o Respiratory Effort  o : breathing comfortably, no cough  · Skin and Subcutaneous Tissue  o General Inspection  o : no visible rash, no lesions  · Neurologic  o Mental Status Examination  o :   § Orientation  § : grossly oriented to person, place and time, no facial droop          Assessment  · Depression     311/F32.9  · Hyperlipidemia     272.4/E78.5  · Hypothyroidism     244.9/E03.9  · Nicotine dependence     305.1/F17.200  · Vitamin D deficiency     268.9/E55.9  · Need for influenza vaccination     V04.81/Z23  · Numbness and tingling in right hand       Anesthesia of skin     782.0/R20.0  Paresthesia of skin     782.0/R20.2      Plan  · Orders  o ACO-17: Screened for tobacco use AND received tobacco cessation intervention (4004F) - 305.1/F17.200 - 02/25/2021  o ACO-14: Influenza immunization was not administered for reasons documented () - V04.81/Z23 - 02/25/2021   pt refused  o Free T4 (85467) - - 02/25/2021  o Physical, Primary Care Panel (CBC, CMP, Lipid, TSH) [facility] (07997, 97196, 08615, 90790) - - 02/25/2021  o Urinalysis with Reflex Microscopy ([facility]) (08628) - - 02/25/2021  o Vitamin D (25-Hydroxy) Level (14265) - - 02/25/2021  o ACO-39: Current medications updated and reviewed (, 1159F) - - 02/25/2021  · Medications  o Euthyrox 112 mcg oral tablet   SIG: Take 1 tablet by mouth once daily   DISP: (90) Tablet with 3 refills  Refilled on 02/25/2021     o fluoxetine 20 mg oral capsule   SIG: take 1 capsule (20 mg) by oral route once daily for 90 days   DISP: (90) Capsule with 3 refills  Refilled on 02/25/2021     o Medications have been Reconciled  o Transition of Care or Provider Policy  · Instructions  o Patient was educated and given low cholesterol diet information.  o Recommended exercise program to assist with cholesterol, weight loss and overall health improvement.  o Advised that cheeses and other sources of dairy fats, animal fats,  fast food, and the extras (candy, pastries, pies, doughnuts and cookies) all contain LDL raising nutrients. Advised to increase fruits, vegetables, whole grains, and to monitor portion sizes.   o *Form of nicotine being used: cigs  o Patient was strongly encouraged to discontinue use of any nicotine containing product or minimize the use of the product.  o Flu vaccine declined.  o Take all medications as prescribed/directed.  o Patient instructed/educated on their diet and exercise program.  o Patient was educated/instructed on their diagnosis, treatment and medications prior to discharge from the clinic today.  o Flu vaccine declined.  · Disposition  o Call or Return if symptoms worsen or persist.  o F/U in 6 months  o Care Transition            Electronically Signed by: Joshua Head PA-C -Author on February 26, 2021 10:25:22 AM

## 2021-05-15 VITALS
SYSTOLIC BLOOD PRESSURE: 120 MMHG | HEART RATE: 85 BPM | DIASTOLIC BLOOD PRESSURE: 70 MMHG | TEMPERATURE: 97.5 F | OXYGEN SATURATION: 97 %

## 2021-05-15 VITALS
TEMPERATURE: 97.6 F | SYSTOLIC BLOOD PRESSURE: 120 MMHG | DIASTOLIC BLOOD PRESSURE: 70 MMHG | HEART RATE: 91 BPM | OXYGEN SATURATION: 98 %

## 2021-05-15 VITALS
SYSTOLIC BLOOD PRESSURE: 142 MMHG | HEART RATE: 82 BPM | BODY MASS INDEX: 30.53 KG/M2 | OXYGEN SATURATION: 99 % | WEIGHT: 194.5 LBS | HEIGHT: 67 IN | DIASTOLIC BLOOD PRESSURE: 80 MMHG

## 2021-05-16 VITALS
HEIGHT: 67 IN | BODY MASS INDEX: 29.82 KG/M2 | OXYGEN SATURATION: 99 % | SYSTOLIC BLOOD PRESSURE: 130 MMHG | WEIGHT: 190 LBS | DIASTOLIC BLOOD PRESSURE: 80 MMHG | HEART RATE: 83 BPM

## 2021-06-05 NOTE — PROGRESS NOTES
Progress Note      Patient Name: Fabiana Wright   Patient ID: [id number]   Sex: Female   YOB: 1965    Primary Care Provider: Joshua Head PA-C   Referring Provider: Joshua Head PA-C    Visit Date: May 7, 2021    Provider: Joshua Head PA-C   [place] [facility] [place] Address[place] [address] [place] [address], [address]  [place], [place]  [id number]   Location Phone: [phone]          Chief Complaint  · Numbness and tingling (R) hand      History Of Present Illness  Fabiana Wright is a 55 year old /White female who presents for evaluation and treatment of: Numbness and tingling (R) hand      Pt presents today for numbness and tingling in (R) hand/fingers.     Pt c/o numbness and tingling (R) hand and fingers for the past few months.     Pt was claiming through [employer] comp, Osei diagnosed her with carpal tunnel syndrome, pt notes she was prescribed a round of prednisone and told to wear a wrist brace. Pt is also taking Diclofenac.    Pt notes her worker's comp was denied, she is requesting we refer her to hand specialist.     Pt has not yet received COVID-19 vaccine, she is unsure if she wants to.    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    Pt cont to smoke and trying to quit.    Left calf swelling - for over 1 year, it comes and goes.  No trauma or discoloration       Past Medical History  Disease Name Date Onset Notes   Anxiety --  --    Cervical radiculitis 01/16/2014 --    Chest pain --  --    Depression --  --    Hyperlipidemia 09/27/2016 --    Hyperlipidemia 11/6/09 --    Hypothyroidism 04/01/2015 --    Lumbar disc w/o myelopathy 01/16/2014 --    Lumbar radiculitis 01/16/2014 --    Migraine Headaches --  --    Muscle Spasm 1/21/10 --    Nicotine dependence 09/27/2016 --    Screening  mammogram, encounter for 2019 - normal, repeat in one year   Thyroid Gland Neoplasm, Malignant 11/3/09 --    Vitamin D deficiency 2018 --          Past Surgical History  Procedure Name Date Notes   Hysterectomy  Complete   Thyroidectomy, Total 1/21/10 --    Tubal ligation --  --          Medication List  Name Date Started Instructions   Calcium 600 600 mg (1,500 mg) oral tablet  take 1 tablet by oral route daily   diclofenac sodium 75 mg oral tablet,delayed release (DR/EC)  take 1 tablet (75 mg) by oral route 2 times per day   Euthyrox 125 mcg oral tablet 2021 take 1 tablet (125 mcg) by oral route once daily on an empty stomach 30 minutes before breakfast for 90 days         Allergy List  Allergen Name Date Reaction Notes   NO KNOWN DRUG ALLERGIES --  --  --        Allergies Reconciled  Family Medical History  Disease Name Relative/Age Notes   Heart Disease  --    Cancer, Unspecified  --          Reproductive History  Menstrual   Menopause Status: Postmenopausal HRT?: No   Pregnancy Summary   Total Pregnancies: 1 Full Term: 1 Premature: 0   Ab Induced: 0 Ab Spontaneous: 0 Ectopics: 0   Multiples: 0 Livin         Social History  Finding Status Start/Stop Quantity Notes   Active but no formal exercise --  --/-- --  --    Alcohol Light --/-- --  --     --  --/-- --  --    No known infection risk --  --/-- --  --    Tobacco Current every day 16/-- 1/2 PPD --          Review of Systems  · Constitutional  o Denies  o : fever, fatigue, weight loss, weight gain  · Cardiovascular  o Denies  o : lower extremity edema, claudication, chest pressure, palpitations  · Respiratory  o Denies  o : shortness of breath, wheezing, cough, hemoptysis, dyspnea on exertion  · Gastrointestinal  o Denies  o : nausea, vomiting, diarrhea, constipation, abdominal pain      Vitals  Date Time BP Position Site L\R Cuff Size HR RR TEMP (F) WT  HT  BMI kg/m2 BSA m2 O2 Sat FR L/min FiO2 HC       2021  "11:24 /99 Sitting    89 - R   202lbs 6.4oz 5'  7\" 31.7 2.08 94 %            Physical Examination  · Constitutional  o Appearance  o : overweight, well developed, alert, in no acute distress  · Respiratory  o Respiratory Effort  o : breathing unlabored  o Inspection of Chest  o : chest rise symmetric bilaterally  o Auscultation of Lungs  o : clear to auscultation bilaterally throughout inspiration and expiration  · Cardiovascular  o Heart  o :   § Auscultation of Heart  § : regular rate and rhythm, no murmurs, gallops or rubs  o Peripheral Vascular System  o :   § Extremities  § : no edema  · Psychiatric  o Mood and Affect  o : mood normal, affect appropriate     HANDS - bilat UE - pos tinels, phalens, neg finklestein, normal allens, mild bilat thenar atrophy    Left Calf - edematous, no discoloration, bruising.  neg homens               Assessment  · Nicotine dependence     305.1/F17.200  · Class 1 obesity due to excess calories with serious comorbidity and body mass index (BMI) of 31.0 to 31.9 in adult       Other obesity due to excess calories     278.00/E66.09  Body mass index [BMI] 31.0-31.9, adult     278.00/Z68.31  · Numbness and tingling in right hand       Anesthesia of skin     782.0/R20.0  Paresthesia of skin     782.0/R20.2  · Carpal tunnel syndrome of right wrist     354.0/G56.01  · Right wrist pain     719.43/M25.531  · Numbness and tingling in both hands       Anesthesia of skin     782.0/R20.0  Paresthesia of skin     782.0/R20.2  · Elevated blood pressure reading     796.2/R03.0  · Left leg swelling     729.81/M79.89      Plan  · Orders  o ACO-17: Screened for tobacco use AND received tobacco cessation intervention (4004F) - 305.1/F17.200 - 05/07/2021  o ACO-39: Current medications updated and reviewed (, 1159F) - - 05/07/2021  o EMG/NCV of Upper Extremities Bilaterally (10130) - 782.0/R20.0, 782.0/R20.2 - 05/07/2021   Bilat UE N/T  o VELE (Dop Scan) Unilateral. 10389) - 729.81/M79.89 - " 05/07/2021  · Medications  o Medications have been Reconciled  o Transition of Care or Provider Policy  · Instructions  o *Form of nicotine being used: cigs  o Patient was strongly encouraged to discontinue use of any nicotine containing product or minimize the use of the product.  o Take all medications as prescribed/directed.  o Patient instructed/educated on their diet and exercise program.  o Patient was educated/instructed on their diagnosis, treatment and medications prior to discharge from the clinic today.  o Patient counseled to reduce calorie intake.  o Patient was instructed to exercise regularly.  o Discussed Covid-19 precautions including, but not limited to, social distancing, avoid touching your face, and hand washing.   · Disposition  o Call or Return if symptoms worsen or persist.  o F/U in clinic in 1 month.  o Care Transition            Electronically Signed by: Joshua Head PA-C -Author on May 9, 2021 07:06:46 PM

## 2021-06-15 ENCOUNTER — TELEPHONE (OUTPATIENT)
Dept: FAMILY MEDICINE CLINIC | Facility: CLINIC | Age: 56
End: 2021-06-15

## 2021-06-15 NOTE — TELEPHONE ENCOUNTER
Caller: Fabiana Wright    Relationship to patient: Self    Best call back number: [phone]    Patient is needing: PATIENT CALLED IN AND SAID SHE WAS RETURNING A CALL.SHE STATED THAT IT MIGHT BE CONCERNING A REFERRAL. PATIENT STATES THAT IT IS OK TO LEAVE A MESSAGE ON HER VOICEMAIL. PLEASE CALL PATIENT AND ADVISE.

## 2021-07-09 ENCOUNTER — PROCEDURE VISIT (OUTPATIENT)
Dept: NEUROLOGY | Facility: CLINIC | Age: 56
End: 2021-07-09

## 2021-07-09 VITALS
WEIGHT: 200 LBS | DIASTOLIC BLOOD PRESSURE: 73 MMHG | SYSTOLIC BLOOD PRESSURE: 131 MMHG | HEIGHT: 67 IN | BODY MASS INDEX: 31.39 KG/M2 | HEART RATE: 87 BPM

## 2021-07-09 DIAGNOSIS — R20.2 NUMBNESS AND TINGLING: ICD-10-CM

## 2021-07-09 DIAGNOSIS — R20.0 NUMBNESS AND TINGLING: ICD-10-CM

## 2021-07-09 DIAGNOSIS — G56.01 CARPAL TUNNEL SYNDROME OF RIGHT WRIST: Primary | ICD-10-CM

## 2021-07-09 PROCEDURE — 99202 OFFICE O/P NEW SF 15 MIN: CPT | Performed by: PSYCHIATRY & NEUROLOGY

## 2021-07-09 PROCEDURE — 95909 NRV CNDJ TST 5-6 STUDIES: CPT | Performed by: PSYCHIATRY & NEUROLOGY

## 2021-07-09 RX ORDER — ACETAMINOPHEN 500 MG
500 TABLET ORAL EVERY 6 HOURS PRN
COMMUNITY
End: 2021-09-30

## 2021-07-09 RX ORDER — LEVOTHYROXINE SODIUM 0.12 MG/1
TABLET ORAL
COMMUNITY
Start: 2021-03-09 | End: 2021-08-05 | Stop reason: SDUPTHER

## 2021-07-09 NOTE — ASSESSMENT & PLAN NOTE
Nerve conduction studies abnormal shows electrophysiologic evidence for moderate to severe right carpal tunnel syndrome and moderate left carpal tunnel syndrome.  I will refer her to orthopedic surgery for carpal tunnel surgery to the right hand.

## 2021-07-09 NOTE — PROGRESS NOTES
"Chief Complaint  Numbness (BUE R>L) and Pain (BUE R>L)    Subjective          Fabiana Wright is a 55 y.o. female who presents to [facility]  History of Present Illness  55-year-old woman evaluated for right hand numbness which been ongoing for the last year.  She states it has gotten worse.  She has been wearing a splint for for several years and it gets numb and tingly at night and during the daytime.  It can last for 20 minutes at a time.  It involves the right hand but not the pinky.  States that it wakes her up at night and there is pain and tingling.  She is here today for EMG nerve conduction study.  Objective   Vital Signs:   /73   Pulse 87   Ht 170.2 cm (67\")   Wt 90.7 kg (200 lb)   BMI 31.32 kg/m²     Physical Exam   There is no weakness of the upper extremities the vision muscle testing.  There is no weakness of intrinsic hand muscles including abductor pollicis brevis muscles.  Phalen sign is positive in the right hand.        Assessment and Plan  Diagnoses and all orders for this visit:    1. Carpal tunnel syndrome of right wrist (Primary)  -     Ambulatory Referral to Orthopedic Surgery    2. Numbness and tingling  Assessment & Plan:  Nerve conduction studies abnormal shows electrophysiologic evidence for moderate to severe right carpal tunnel syndrome and moderate left carpal tunnel syndrome.  I will refer her to orthopedic surgery for carpal tunnel surgery to the right hand.         Nerve Conduction Study:   6 nerves     EMG:  Not performed    Total time spent with the patient and coordinating patient care was 15 minutes.    Follow Up  No follow-ups on file.  Patient was given instructions and counseling regarding her condition or for health maintenance advice. Please see specific information pulled into the AVS if appropriate.       "

## 2021-07-12 ENCOUNTER — TELEPHONE (OUTPATIENT)
Dept: FAMILY MEDICINE CLINIC | Facility: CLINIC | Age: 56
End: 2021-07-12

## 2021-07-15 VITALS
DIASTOLIC BLOOD PRESSURE: 99 MMHG | WEIGHT: 202.37 LBS | BODY MASS INDEX: 31.76 KG/M2 | OXYGEN SATURATION: 94 % | SYSTOLIC BLOOD PRESSURE: 152 MMHG | HEART RATE: 89 BPM | HEIGHT: 67 IN

## 2021-07-20 ENCOUNTER — OFFICE VISIT (OUTPATIENT)
Dept: ORTHOPEDIC SURGERY | Facility: CLINIC | Age: 56
End: 2021-07-20

## 2021-07-20 ENCOUNTER — PREP FOR SURGERY (OUTPATIENT)
Dept: OTHER | Facility: HOSPITAL | Age: 56
End: 2021-07-20

## 2021-07-20 VITALS — OXYGEN SATURATION: 97 % | WEIGHT: 206 LBS | HEIGHT: 67 IN | BODY MASS INDEX: 32.33 KG/M2 | HEART RATE: 67 BPM

## 2021-07-20 DIAGNOSIS — G56.01 CARPAL TUNNEL SYNDROME OF RIGHT WRIST: Primary | ICD-10-CM

## 2021-07-20 DIAGNOSIS — G56.03 BILATERAL CARPAL TUNNEL SYNDROME: Primary | ICD-10-CM

## 2021-07-20 PROCEDURE — 99204 OFFICE O/P NEW MOD 45 MIN: CPT | Performed by: ORTHOPAEDIC SURGERY

## 2021-07-20 RX ORDER — CEFAZOLIN SODIUM IN 0.9 % NACL 3 G/100 ML
3 INTRAVENOUS SOLUTION, PIGGYBACK (ML) INTRAVENOUS ONCE
Status: CANCELLED | OUTPATIENT
Start: 2021-07-20 | End: 2021-07-20

## 2021-07-20 RX ORDER — CEFAZOLIN SODIUM 2 G/100ML
2 INJECTION, SOLUTION INTRAVENOUS ONCE
Status: CANCELLED | OUTPATIENT
Start: 2021-07-20 | End: 2021-07-20

## 2021-07-20 NOTE — PROGRESS NOTES
"Chief Complaint  Initial Evaluation of the Left Hand and Initial Evaluation of the Right Hand     Subjective      Fabiana Wright presents to [facility] for an evaluation of bilateral hands. Patient states numbness and tingling in bilateral hands for 7 years. She has tried bracing and splints over the years with little relief. In the last 5-6 months the numbness and tingling has significantly gotten worse. She states she is constantly shaking her hands throughout the day and night. The numbness and tingling has been affecting her sleep at night. She states numbness and tingling in index and middle finger. Patient obtained an EMG by Dr. Douglas which revealed moderate to severe carpal tunnel symptoms on the right wrist, moderate on the left. Patient has been working at the same job for 27 years, which causes high demand on her hands resulting in numbness and tingling.     No Known Allergies     Social History     Socioeconomic History   • Marital status:      Spouse name: Not on file   • Number of children: Not on file   • Years of education: Not on file   • Highest education level: Not on file   Tobacco Use   • Smoking status: Current Every Day Smoker     Packs/day: 0.50   • Smokeless tobacco: Never Used   Vaping Use   • Vaping Use: Never used   Substance and Sexual Activity   • Alcohol use: Yes     Comment: LIGHT   • Drug use: Never   • Sexual activity: Defer        Review of Systems     Objective   Vital Signs:   Pulse 67   Ht 170.2 cm (67\")   Wt 93.4 kg (206 lb)   SpO2 97%   BMI 32.26 kg/m²       Physical Exam  Constitutional:       Appearance: Normal appearance. He is well-developed and normal weight.   HENT:      Head: Normocephalic.      Right Ear: Hearing and external ear normal.      Left Ear: Hearing and external ear normal.      Nose: Nose normal.   Eyes:      Conjunctiva/sclera: Conjunctivae normal.   Cardiovascular:      Rate and Rhythm: Normal rate. "   Pulmonary:      Effort: Pulmonary effort is normal.      Breath sounds: No wheezing or rales.   Abdominal:      Palpations: Abdomen is soft.      Tenderness: There is no abdominal tenderness.   Musculoskeletal:      Cervical back: Normal range of motion.   Skin:     Findings: No rash.   Neurological:      Mental Status: He is alert and oriented to person, place, and time.   Psychiatric:         Mood and Affect: Mood and affect normal.         Judgment: Judgment normal.       Ortho Exam      BILATERAL HANDS: Positive Tinel's. Positive Phalen's. Neurovascular intact. Sensation grossly intact. No swelling, atrophy, and skin discoloration. Skin intact. Full ROM. Patient able to wiggle fingers and make a fist. Full wrist extension, full wrist flexion, full , full thumb opposition, full PIP flexors, full DIP flexors, full PIP extensors, full finger adduction, full finger abduction. Radial pulse 2+, ulnar pulse 2+.      Procedures      Imaging Results (Most Recent)     None           Result Review :       [facility]   IMPRESSION: Moderate to severe right and moderate left carpal tunnel syndrome.        Assessment and Plan     DX: Bilateral carpal tunnel syndrome     Discussed treatment plans and diagnosis with the patient. Discussed operative vs non-operative measures. Patient wishes to proceed with right carpal tunnel release.     Educated on risk of smoking related to procedure. Discussed options for smoking cessation., Discussed surgery., Risks/benefits discussed with patient including, but not limited to: infection, bleeding, neurovascular damage, malunion, nonunion, aesthetic deformity, need for further surgery, and death., Surgery pamphlet given. and Call or return if worsening symptoms.    Follow Up     Post-operatively.       Patient was given instructions and counseling regarding her condition or for health maintenance advice. Please see specific information pulled into the AVS if  appropriate.     Scribed for Roxana Greene MD by Luz Suarez.  07/20/21   10:51 EDT    I have personally performed the services described in this document as scribed by the above individual and it is both accurate and complete.  Roxana Greene MD 07/20/21  10:51 EDT

## 2021-07-23 ENCOUNTER — LAB (OUTPATIENT)
Dept: LAB | Facility: HOSPITAL | Age: 56
End: 2021-07-23

## 2021-07-23 DIAGNOSIS — G56.01 CARPAL TUNNEL SYNDROME OF RIGHT WRIST: ICD-10-CM

## 2021-07-23 LAB — SARS-COV-2 RNA RESP QL NAA+PROBE: NOT DETECTED

## 2021-07-23 PROCEDURE — U0003 INFECTIOUS AGENT DETECTION BY NUCLEIC ACID (DNA OR RNA); SEVERE ACUTE RESPIRATORY SYNDROME CORONAVIRUS 2 (SARS-COV-2) (CORONAVIRUS DISEASE [COVID-19]), AMPLIFIED PROBE TECHNIQUE, MAKING USE OF HIGH THROUGHPUT TECHNOLOGIES AS DESCRIBED BY CMS-2020-01-R: HCPCS

## 2021-07-23 PROCEDURE — C9803 HOPD COVID-19 SPEC COLLECT: HCPCS

## 2021-07-28 ENCOUNTER — HOSPITAL ENCOUNTER (OUTPATIENT)
Facility: HOSPITAL | Age: 56
Setting detail: HOSPITAL OUTPATIENT SURGERY
Discharge: HOME | End: 2021-07-28
Attending: ORTHOPAEDIC SURGERY | Admitting: ORTHOPAEDIC SURGERY

## 2021-07-28 ENCOUNTER — ANESTHESIA (OUTPATIENT)
Dept: PERIOP | Facility: HOSPITAL | Age: 56
End: 2021-07-28

## 2021-07-28 ENCOUNTER — ANESTHESIA EVENT (OUTPATIENT)
Dept: PERIOP | Facility: HOSPITAL | Age: 56
End: 2021-07-28

## 2021-07-28 VITALS
RESPIRATION RATE: 16 BRPM | HEART RATE: 70 BPM | TEMPERATURE: 97 F | HEIGHT: 67 IN | SYSTOLIC BLOOD PRESSURE: 124 MMHG | WEIGHT: 199.3 LBS | BODY MASS INDEX: 31.28 KG/M2 | OXYGEN SATURATION: 98 % | DIASTOLIC BLOOD PRESSURE: 78 MMHG

## 2021-07-28 DIAGNOSIS — G56.01 CARPAL TUNNEL SYNDROME OF RIGHT WRIST: ICD-10-CM

## 2021-07-28 PROCEDURE — 64721 CARPAL TUNNEL SURGERY: CPT | Performed by: ORTHOPAEDIC SURGERY

## 2021-07-28 PROCEDURE — 25010000003 CEFAZOLIN IN DEXTROSE 2-4 GM/100ML-% SOLUTION: Performed by: ORTHOPAEDIC SURGERY

## 2021-07-28 PROCEDURE — 25010000002 MIDAZOLAM PER 1MG: Performed by: ANESTHESIOLOGY

## 2021-07-28 PROCEDURE — 25010000002 FENTANYL CITRATE (PF) 50 MCG/ML SOLUTION: Performed by: NURSE ANESTHETIST, CERTIFIED REGISTERED

## 2021-07-28 PROCEDURE — 25010000002 PROPOFOL 10 MG/ML EMULSION: Performed by: NURSE ANESTHETIST, CERTIFIED REGISTERED

## 2021-07-28 RX ORDER — LIDOCAINE HYDROCHLORIDE 5 MG/ML
INJECTION, SOLUTION INFILTRATION; INTRAVENOUS AS NEEDED
Status: DISCONTINUED | OUTPATIENT
Start: 2021-07-28 | End: 2021-07-28 | Stop reason: SURG

## 2021-07-28 RX ORDER — CEFAZOLIN SODIUM IN 0.9 % NACL 3 G/100 ML
3 INTRAVENOUS SOLUTION, PIGGYBACK (ML) INTRAVENOUS ONCE
Status: DISCONTINUED | OUTPATIENT
Start: 2021-07-28 | End: 2021-07-28 | Stop reason: DRUGHIGH

## 2021-07-28 RX ORDER — HYDROCODONE BITARTRATE AND ACETAMINOPHEN 7.5; 325 MG/1; MG/1
1 TABLET ORAL ONCE AS NEEDED
Status: DISCONTINUED | OUTPATIENT
Start: 2021-07-28 | End: 2021-07-28 | Stop reason: HOSPADM

## 2021-07-28 RX ORDER — FENTANYL CITRATE 50 UG/ML
INJECTION, SOLUTION INTRAMUSCULAR; INTRAVENOUS AS NEEDED
Status: DISCONTINUED | OUTPATIENT
Start: 2021-07-28 | End: 2021-07-28 | Stop reason: SURG

## 2021-07-28 RX ORDER — MEPERIDINE HYDROCHLORIDE 25 MG/ML
12.5 INJECTION INTRAMUSCULAR; INTRAVENOUS; SUBCUTANEOUS
Status: DISCONTINUED | OUTPATIENT
Start: 2021-07-28 | End: 2021-07-28 | Stop reason: HOSPADM

## 2021-07-28 RX ORDER — CEFAZOLIN SODIUM 2 G/100ML
2 INJECTION, SOLUTION INTRAVENOUS ONCE
Status: COMPLETED | OUTPATIENT
Start: 2021-07-28 | End: 2021-07-28

## 2021-07-28 RX ORDER — ONDANSETRON 2 MG/ML
4 INJECTION INTRAMUSCULAR; INTRAVENOUS ONCE AS NEEDED
Status: DISCONTINUED | OUTPATIENT
Start: 2021-07-28 | End: 2021-07-28 | Stop reason: HOSPADM

## 2021-07-28 RX ORDER — MIDAZOLAM HYDROCHLORIDE 2 MG/2ML
2 INJECTION, SOLUTION INTRAMUSCULAR; INTRAVENOUS ONCE
Status: COMPLETED | OUTPATIENT
Start: 2021-07-28 | End: 2021-07-28

## 2021-07-28 RX ORDER — SODIUM CHLORIDE, SODIUM LACTATE, POTASSIUM CHLORIDE, CALCIUM CHLORIDE 600; 310; 30; 20 MG/100ML; MG/100ML; MG/100ML; MG/100ML
9 INJECTION, SOLUTION INTRAVENOUS CONTINUOUS PRN
Status: DISCONTINUED | OUTPATIENT
Start: 2021-07-28 | End: 2021-07-28 | Stop reason: HOSPADM

## 2021-07-28 RX ORDER — PROMETHAZINE HYDROCHLORIDE 25 MG/1
25 SUPPOSITORY RECTAL ONCE AS NEEDED
Status: DISCONTINUED | OUTPATIENT
Start: 2021-07-28 | End: 2021-07-28 | Stop reason: HOSPADM

## 2021-07-28 RX ORDER — OXYCODONE HYDROCHLORIDE 5 MG/1
5 TABLET ORAL
Status: DISCONTINUED | OUTPATIENT
Start: 2021-07-28 | End: 2021-07-28 | Stop reason: HOSPADM

## 2021-07-28 RX ORDER — PROMETHAZINE HYDROCHLORIDE 12.5 MG/1
25 TABLET ORAL ONCE AS NEEDED
Status: DISCONTINUED | OUTPATIENT
Start: 2021-07-28 | End: 2021-07-28 | Stop reason: HOSPADM

## 2021-07-28 RX ORDER — BUPIVACAINE HYDROCHLORIDE 5 MG/ML
INJECTION, SOLUTION EPIDURAL; INTRACAUDAL AS NEEDED
Status: DISCONTINUED | OUTPATIENT
Start: 2021-07-28 | End: 2021-07-28 | Stop reason: HOSPADM

## 2021-07-28 RX ORDER — ACETAMINOPHEN 500 MG
1000 TABLET ORAL ONCE
Status: COMPLETED | OUTPATIENT
Start: 2021-07-28 | End: 2021-07-28

## 2021-07-28 RX ORDER — HYDROCODONE BITARTRATE AND ACETAMINOPHEN 7.5; 325 MG/1; MG/1
1 TABLET ORAL EVERY 4 HOURS PRN
Qty: 30 TABLET | Refills: 0 | Status: SHIPPED | OUTPATIENT
Start: 2021-07-28 | End: 2021-09-30

## 2021-07-28 RX ADMIN — SODIUM CHLORIDE, POTASSIUM CHLORIDE, SODIUM LACTATE AND CALCIUM CHLORIDE 9 ML/HR: 600; 310; 30; 20 INJECTION, SOLUTION INTRAVENOUS at 07:13

## 2021-07-28 RX ADMIN — FENTANYL CITRATE 100 MCG: 50 INJECTION INTRAMUSCULAR; INTRAVENOUS at 08:08

## 2021-07-28 RX ADMIN — MIDAZOLAM HYDROCHLORIDE 2 MG: 1 INJECTION, SOLUTION INTRAMUSCULAR; INTRAVENOUS at 07:48

## 2021-07-28 RX ADMIN — LIDOCAINE HYDROCHLORIDE 50 ML: 5 INJECTION, SOLUTION INFILTRATION at 08:11

## 2021-07-28 RX ADMIN — ACETAMINOPHEN 1000 MG: 500 TABLET ORAL at 07:13

## 2021-07-28 RX ADMIN — CEFAZOLIN SODIUM 2 G: 2 INJECTION, SOLUTION INTRAVENOUS at 08:06

## 2021-07-28 RX ADMIN — PROPOFOL 125 MCG/KG/MIN: 10 INJECTION, EMULSION INTRAVENOUS at 08:15

## 2021-07-28 NOTE — ANESTHESIA PREPROCEDURE EVALUATION
Anesthesia Evaluation     Patient summary reviewed and Nursing notes reviewed   no history of anesthetic complications:  NPO Solid Status: > 8 hours  NPO Liquid Status: > 2 hours           Airway   Mallampati: II  TM distance: >3 FB  Neck ROM: full  No difficulty expected  Dental      Pulmonary - negative pulmonary ROS and normal exam    breath sounds clear to auscultation  Cardiovascular - normal exam  Exercise tolerance: good (4-7 METS)    Rhythm: regular    (+) hyperlipidemia,       Neuro/Psych  (+) headaches, numbness, psychiatric history,     GI/Hepatic/Renal/Endo - negative ROS     Musculoskeletal (-) negative ROS    Abdominal    Substance History - negative use     OB/GYN negative ob/gyn ROS         Other      history of cancer                  Anesthesia Plan    ASA 2     Alba block   total IV anesthesia(Total IV Anesthesia    Patient understands anesthesia not responsible for dental damage.  )  intravenous induction     Anesthetic plan, all risks, benefits, and alternatives have been provided, discussed and informed consent has been obtained with: patient.    Plan discussed with CRNA.

## 2021-07-28 NOTE — ANESTHESIA POSTPROCEDURE EVALUATION
Patient: Fabiana Wright    Procedure Summary     Date: 07/28/21 Room / Location: [facility] OR [address] [place] [address] OR [facility]    Anesthesia Start: 0804 Anesthesia Stop: 0846    Procedure: CARPAL TUNNEL RELEASE (Right Wrist) Diagnosis:       Carpal tunnel syndrome of right wrist      (Carpal tunnel syndrome of right wrist [G56.01])    Surgeons: Roxana Greene MD Provider: Rell Lopez MD    Anesthesia Type: Eugenie block ASA Status: 2          Anesthesia Type: Eugenie block    Vitals  Vitals Value Taken Time   /78 07/28/21 0917   Temp 36.1 °C (97 °F) 07/28/21 0915   Pulse 73 07/28/21 0917   Resp 16 07/28/21 0915   SpO2 96 % 07/28/21 0917   Vitals shown include unvalidated device data.        Post Anesthesia Care and Evaluation    Patient location during evaluation: bedside  Patient participation: complete - patient participated  Level of consciousness: awake and alert  Pain management: adequate  Airway patency: patent  Anesthetic complications: No anesthetic complications  PONV Status: none  Cardiovascular status: acceptable  Respiratory status: acceptable  Hydration status: acceptable

## 2021-08-05 ENCOUNTER — OFFICE VISIT (OUTPATIENT)
Dept: FAMILY MEDICINE CLINIC | Facility: CLINIC | Age: 56
End: 2021-08-05

## 2021-08-05 VITALS
OXYGEN SATURATION: 92 % | SYSTOLIC BLOOD PRESSURE: 131 MMHG | BODY MASS INDEX: 31.42 KG/M2 | HEIGHT: 67 IN | DIASTOLIC BLOOD PRESSURE: 74 MMHG | WEIGHT: 200.2 LBS | HEART RATE: 86 BPM

## 2021-08-05 DIAGNOSIS — E66.09 CLASS 1 OBESITY DUE TO EXCESS CALORIES WITH SERIOUS COMORBIDITY AND BODY MASS INDEX (BMI) OF 31.0 TO 31.9 IN ADULT: Chronic | ICD-10-CM

## 2021-08-05 DIAGNOSIS — R06.83 SNORING: ICD-10-CM

## 2021-08-05 DIAGNOSIS — E03.9 HYPOTHYROIDISM, UNSPECIFIED TYPE: Primary | ICD-10-CM

## 2021-08-05 DIAGNOSIS — Z12.11 SCREEN FOR COLON CANCER: ICD-10-CM

## 2021-08-05 DIAGNOSIS — L03.039 ONYCHIA OF TOE, UNSPECIFIED LATERALITY: ICD-10-CM

## 2021-08-05 PROCEDURE — 99214 OFFICE O/P EST MOD 30 MIN: CPT | Performed by: PHYSICIAN ASSISTANT

## 2021-08-05 RX ORDER — LEVOTHYROXINE SODIUM 0.12 MG/1
125 TABLET ORAL DAILY
Qty: 90 TABLET | Refills: 1 | Status: SHIPPED | OUTPATIENT
Start: 2021-08-05 | End: 2022-02-08 | Stop reason: SDUPTHER

## 2021-08-05 RX ORDER — TERBINAFINE HYDROCHLORIDE 250 MG/1
250 TABLET ORAL DAILY
Qty: 90 TABLET | Refills: 0 | Status: SHIPPED | OUTPATIENT
Start: 2021-08-05 | End: 2022-02-08

## 2021-08-05 RX ORDER — LEVOTHYROXINE SODIUM 0.12 MG/1
125 TABLET ORAL DAILY
Qty: 90 TABLET | Refills: 1 | Status: SHIPPED | OUTPATIENT
Start: 2021-08-05 | End: 2021-08-05 | Stop reason: SDUPTHER

## 2021-08-05 NOTE — PROGRESS NOTES
"Chief Complaint  Follow-up (rt wrist surgery)    Subjective          Fabiana Wright presents to [facility]  History of Present Illness  Pt presents today for follow up on rt wrist surgery.    Pt states she has rt wrist surgery on 7/28 by  for carpal tunnel.  Pt states she is doing well after surgery. Pt states she takes extra strength tylenol 1xday for pain.  Pt has a f/u with  on 8/12 and will discuss doing surgery on left hand.      Pt states she would like to discuss a sleep apnea test due to her o2 level dropped when being prepped for surgery.    Pt states she has a fungus on both great toes. Pt states she has had the fungus for the last year. Pt states she wears steel toe boots and believes that could have something to do with it.    Labs 3/5/21  cln na    Colon is due, she is willing to in Oct 2021    Objective   Vital Signs:   /74 (BP Location: Left arm)   Pulse 86   Ht 170.2 cm (67\")   Wt 90.8 kg (200 lb 3.2 oz)   SpO2 92%   BMI 31.36 kg/m²     Physical Exam  Vitals and nursing note reviewed.   Constitutional:       Appearance: Normal appearance. She is obese.   HENT:      Head: Normocephalic and atraumatic.   Cardiovascular:      Rate and Rhythm: Normal rate and regular rhythm.   Pulmonary:      Effort: Pulmonary effort is normal.      Breath sounds: Normal breath sounds.   Musculoskeletal:      Cervical back: Neck supple.   Neurological:      Mental Status: She is alert.   Psychiatric:         Mood and Affect: Mood normal.         Behavior: Behavior normal.      Right forearm/wrist splint in place  Toenails - bilat thickening, discolored    Result Review :     Common labs    Common Labsle 3/5/21   Glucose 92   BUN 10   Creatinine 0.90   Sodium 144   Potassium 3.6   Chloride 102   Calcium 9.1   Albumin 4.4   Total Bilirubin 0.24   Alkaline Phosphatase 75   AST (SGOT) 16   ALT (SGPT) 18   WBC 9.56   Hemoglobin 14.8   Hematocrit 45.6   Platelets " 316   Total Cholesterol 265 (A)   Triglycerides 356 (A)   HDL Cholesterol 38 (A)   LDL Cholesterol  156 (A)   (A) Abnormal value       Comments are available for some flowsheets but are not being displayed.                       Assessment and Plan      Diagnoses and all orders for this visit:    1. Hypothyroidism, unspecified type (Primary)  -     Discontinue: levothyroxine (Euthyrox) 125 MCG tablet; Take 1 tablet by mouth Daily.  Dispense: 90 tablet; Refill: 1  -     levothyroxine (Euthyrox) 125 MCG tablet; Take 1 tablet by mouth Daily.  Dispense: 90 tablet; Refill: 1    2. Screen for colon cancer  -     Amb referral for Screening Colonoscopy    3. Class 1 obesity due to excess calories with serious comorbidity and body mass index (BMI) of 31.0 to 31.9 in adult  Comments:  Disc diet and exercise    4. Snoring  -     Ambulatory Referral to Sleep Medicine    5. Onychia of toe, unspecified laterality  -     terbinafine (LamISIL) 250 MG tablet; Take 1 tablet by mouth Daily.  Dispense: 90 tablet; Refill: 0      Follow Up     Return in about 6 months (around 2/5/2022).    I have reviewed information obtained and documented by others and I have confirmed the accuracy of this documented note.     Patient was given instructions and counseling regarding her condition or for health maintenance advice. Please see specific information pulled into the AVS if appropriate.     SUZI Bright

## 2021-08-12 ENCOUNTER — OFFICE VISIT (OUTPATIENT)
Dept: ORTHOPEDIC SURGERY | Facility: CLINIC | Age: 56
End: 2021-08-12

## 2021-08-12 VITALS — BODY MASS INDEX: 31.08 KG/M2 | OXYGEN SATURATION: 96 % | HEART RATE: 80 BPM | HEIGHT: 67 IN | WEIGHT: 198 LBS

## 2021-08-12 DIAGNOSIS — Z47.89 AFTERCARE FOLLOWING SURGERY OF THE MUSCULOSKELETAL SYSTEM: Primary | ICD-10-CM

## 2021-08-12 PROCEDURE — 99024 POSTOP FOLLOW-UP VISIT: CPT | Performed by: PHYSICIAN ASSISTANT

## 2021-08-12 NOTE — PATIENT INSTRUCTIONS
Stitches removed in the clinic today.  Wound care was discussed, recommend she monitor for signs of infection and inform our office if she experiences any of these symtpoms. Recommend Tylenol and ibuprofen for pain.  Recommend splint with activities.  Steri strips placed over wound after cleansing. We will follow up in 2 weeks for wound recheck given patient has mild dehiscence.

## 2021-08-12 NOTE — PROGRESS NOTES
"Chief Complaint  Follow-up of the Right Wrist    Subjective          Fabiana Wright presents to [facility] for follow-up on right wrist status post right carpal tunnel release.  Patient is 2 weeks postop.  She has been working on gentle range of motion of the digits at home.      Objective   Vital Signs:   Pulse 80   Ht 170.2 cm (67\")   Wt 89.8 kg (198 lb)   SpO2 96%   BMI 31.01 kg/m²       Physical Exam  Constitutional:       Appearance: Normal appearance. He is well-developed and normal weight.   HENT:      Head: Normocephalic.      Right Ear: Hearing and external ear normal.      Left Ear: Hearing and external ear normal.      Nose: Nose normal.   Eyes:      Conjunctiva/sclera: Conjunctivae normal.   Cardiovascular:      Rate and Rhythm: Normal rate.   Pulmonary:      Effort: Pulmonary effort is normal.      Breath sounds: No wheezing or rales.   Abdominal:      Palpations: Abdomen is soft.      Tenderness: There is no abdominal tenderness.   Musculoskeletal:      Cervical back: Normal range of motion.   Skin:     Findings: No rash.   Neurological:      Mental Status: He is alert and oriented to person, place, and time.   Psychiatric:         Mood and Affect: Mood and affect normal.         Judgment: Judgment normal.     Ortho Exam  Right hand: Proximal aspect of surgical incision with slight dehiscence, no erythema or purulent drainage.  No tenderness to palpation around the surgical incision. Good range of motion all digits on RUE as well as wrist.  Sensation to light touch is intact in the upper extremities, negative Tinel's at the wrist.  Good wrist range of motion with flexion extension supination and pronation.  Radial pulse 2+, cap refill less than 2 seconds.  Result Review :            Imaging Results (Most Recent)     None                Assessment and Plan    Problem List Items Addressed This Visit        Musculoskeletal and Injuries    Aftercare following surgery of " the musculoskeletal system - Primary    Current Assessment & Plan     Stitches removed in the clinic today.  Wound care was discussed, recommend she monitor for signs of infection and inform our office if she experiences any of these symtpoms. Recommend Tylenol and ibuprofen for pain.  Recommend splint with activities.  Steri strips placed over wound after cleansing. We will follow up in 2 weeks for wound recheck given patient has mild dehiscence.                Follow Up   Return in about 2 weeks (around 8/26/2021) for Recheck.  Patient Instructions   Stitches removed in the clinic today.  Wound care was discussed, recommend she monitor for signs of infection and inform our office if she experiences any of these symtpoms. Recommend Tylenol and ibuprofen for pain.  Recommend splint with activities.  Steri strips placed over wound after cleansing. We will follow up in 2 weeks for wound recheck given patient has mild dehiscence.     Patient was given instructions and counseling regarding her condition or for health maintenance advice. Please see specific information pulled into the AVS if appropriate.

## 2021-08-24 ENCOUNTER — OFFICE VISIT (OUTPATIENT)
Dept: ORTHOPEDIC SURGERY | Facility: CLINIC | Age: 56
End: 2021-08-24

## 2021-08-24 ENCOUNTER — PREP FOR SURGERY (OUTPATIENT)
Dept: OTHER | Facility: HOSPITAL | Age: 56
End: 2021-08-24

## 2021-08-24 VITALS — HEART RATE: 76 BPM | OXYGEN SATURATION: 97 % | WEIGHT: 200 LBS | BODY MASS INDEX: 31.39 KG/M2 | HEIGHT: 67 IN

## 2021-08-24 DIAGNOSIS — G56.02 CARPAL TUNNEL SYNDROME OF LEFT WRIST: Primary | ICD-10-CM

## 2021-08-24 DIAGNOSIS — G56.02 LEFT CARPAL TUNNEL SYNDROME: ICD-10-CM

## 2021-08-24 DIAGNOSIS — Z47.89 ORTHOPEDIC AFTERCARE: Primary | ICD-10-CM

## 2021-08-24 PROCEDURE — 99024 POSTOP FOLLOW-UP VISIT: CPT | Performed by: ORTHOPAEDIC SURGERY

## 2021-08-24 RX ORDER — CEFAZOLIN SODIUM 2 G/100ML
2 INJECTION, SOLUTION INTRAVENOUS ONCE
Status: CANCELLED | OUTPATIENT
Start: 2021-08-24 | End: 2021-08-24

## 2021-08-24 RX ORDER — CEFAZOLIN SODIUM IN 0.9 % NACL 3 G/100 ML
3 INTRAVENOUS SOLUTION, PIGGYBACK (ML) INTRAVENOUS ONCE
Status: CANCELLED | OUTPATIENT
Start: 2021-08-24 | End: 2021-08-24

## 2021-08-24 NOTE — PROGRESS NOTES
"Chief Complaint  Follow-up of the Right Hand and Follow-up of the Left Hand     Subjective      Fabiana Wright presents to [facility] for a follow-up of bilateral hands. Patient is s/p right carpal tunnel release performed on 7/28/2021 by Dr. Greene. Patient has a history of left carpal tunnel syndrome that she has been treating conservatively. Patient states her right carpal tunnel release is doing great. She states that she has been doing her home exercises. She is ready to discuss surgical intervention for her left hand.     No Known Allergies     Social History     Socioeconomic History   • Marital status:      Spouse name: Not on file   • Number of children: Not on file   • Years of education: Not on file   • Highest education level: Not on file   Tobacco Use   • Smoking status: Current Every Day Smoker     Packs/day: 0.50     Years: 33.00     Pack years: 16.50     Types: Cigarettes   • Smokeless tobacco: Never Used   Vaping Use   • Vaping Use: Never used   Substance and Sexual Activity   • Alcohol use: Not Currently     Comment: LIGHT   • Drug use: Never   • Sexual activity: Defer        Review of Systems     Objective   Vital Signs:   Pulse 76   Ht 170.2 cm (67\")   Wt 90.7 kg (200 lb)   SpO2 97%   BMI 31.32 kg/m²       Physical Exam  Constitutional:       Appearance: Normal appearance. She is well-developed and normal weight.   HENT:      Head: Normocephalic.      Right Ear: Hearing and external ear normal.      Left Ear: Hearing and external ear normal.      Nose: Nose normal.   Eyes:      Conjunctiva/sclera: Conjunctivae normal.   Cardiovascular:      Rate and Rhythm: Normal rate.   Pulmonary:      Effort: Pulmonary effort is normal.      Breath sounds: No wheezing or rales.   Abdominal:      Palpations: Abdomen is soft.      Tenderness: There is no abdominal tenderness.   Musculoskeletal:      Cervical back: Normal range of motion.   Skin:     Findings: No rash. "   Neurological:      Mental Status: She is alert and oriented to person, place, and time.   Psychiatric:         Mood and Affect: Mood and affect normal.         Judgment: Judgment normal.       Ortho Exam      LEFT HAND: Positive Tinel's. Positive Phalen's. Neurovascular intact. Sensation grossly intact. No swelling, atrophy, and skin discoloration. Skin intact. Full ROM. Patient able to wiggle fingers and make a fist. Full wrist extension, full wrist flexion, full , full thumb opposition, full PIP flexors, full DIP flexors, full PIP extensors, full finger adduction, full finger abduction. Radial pulse 2+, ulnar pulse 2+.        RIGHT HAND: Well healed scars. No signs of infection. Neurovascular intact. Sensation grossly intact. No swelling, atrophy, and skin discoloration. Skin intact. Full ROM. Patient able to wiggle fingers and make a fist. Full wrist extension, full wrist flexion, full , full thumb opposition, full PIP flexors, full DIP flexors, full PIP extensors, full finger adduction, full finger abduction. Radial pulse 2+, ulnar pulse 2+.        Procedures      Imaging Results (Most Recent)     None           Result Review :       No results found.          Assessment and Plan     DX: Aftercare following right carpal tunnel release  Left carpal tunnel syndrome    Discussed treatment plans and diagnosis with the patient. She will continue her post-operative exercises for her right hand. Operative vs non-operative measures were discussed for the left hand. We will proceed with a left carpal tunnel release.     Educated on risk of smoking related to procedure. Discussed options for smoking cessation., Discussed surgery., Risks/benefits discussed with patient including, but not limited to: infection, bleeding, neurovascular damage, re-rupture, aesthetic deformity, need for further surgery, and death., Surgery pamphlet given. and Call or return if worsening symptoms.    Follow Up     Post-op.        Patient was given instructions and counseling regarding her condition or for health maintenance advice. Please see specific information pulled into the AVS if appropriate.     Scribed for Roxana Greene MD by Luz Suarez.  08/24/21   07:55 EDT

## 2021-08-30 ENCOUNTER — TRANSCRIBE ORDERS (OUTPATIENT)
Dept: LAB | Facility: HOSPITAL | Age: 56
End: 2021-08-30

## 2021-08-30 DIAGNOSIS — Z01.818 PRE-OP TESTING: Primary | ICD-10-CM

## 2021-09-21 ENCOUNTER — OFFICE VISIT (OUTPATIENT)
Dept: ORTHOPEDIC SURGERY | Facility: CLINIC | Age: 56
End: 2021-09-21

## 2021-09-21 VITALS — HEART RATE: 87 BPM | WEIGHT: 195 LBS | HEIGHT: 67 IN | OXYGEN SATURATION: 97 % | BODY MASS INDEX: 30.61 KG/M2

## 2021-09-21 DIAGNOSIS — Z47.89 AFTERCARE FOLLOWING SURGERY OF THE MUSCULOSKELETAL SYSTEM: Primary | ICD-10-CM

## 2021-09-21 DIAGNOSIS — G56.02 CARPAL TUNNEL SYNDROME OF LEFT WRIST: ICD-10-CM

## 2021-09-21 PROCEDURE — 99024 POSTOP FOLLOW-UP VISIT: CPT | Performed by: PHYSICIAN ASSISTANT

## 2021-09-21 NOTE — PATIENT INSTRUCTIONS
Right hand and wrist pain and range of motion is improving.  Unfortunately, patient's left carpal tunnel release surgery was postponed due to COVID-19 restrictions.  She will remain off work at this time.  Patient plans to speak with her employer to determine if her FMLA can be extended, if so the patient will remain off work, if FMLA cannot be extended I will provide the patient a work note that she can  in the office.  I recommended the patient call our office once she has FMLA figured out.  She will begin wearing a brace to the left hand and wrist during activity and during sleep.

## 2021-10-06 ENCOUNTER — LAB (OUTPATIENT)
Dept: LAB | Facility: HOSPITAL | Age: 56
End: 2021-10-06

## 2021-10-06 DIAGNOSIS — Z01.818 PREOP TESTING: ICD-10-CM

## 2021-10-06 PROCEDURE — C9803 HOPD COVID-19 SPEC COLLECT: HCPCS

## 2021-10-06 PROCEDURE — U0004 COV-19 TEST NON-CDC HGH THRU: HCPCS

## 2021-10-07 LAB — SARS-COV-2 RNA NOSE QL NAA+PROBE: NOT DETECTED

## 2021-10-10 NOTE — PROGRESS NOTES
"Chief Complaint  No chief complaint on file.    Subjective          Fabiana Wright presents to [facility] for follow-up on bilateral hand/wrist.  Patient had right carpal tunnel release performed by Dr. Greene 7/28/2021.  She states her pain and range of motion have greatly improved.  She states that she feels that she would be able to return to work if it were not for her left hand and wrist pain.  She has left carpal tunnel syndrome as well.  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  She is not currently using a carpal tunnel brace on the left upper extremity.    Objective   No Known Allergies    Vital Signs:   Ht 170.2 cm (67\")   Wt 88.5 kg (195 lb)   BMI 30.54 kg/m²       Physical Exam  Constitutional:       Appearance: Normal appearance. Patient is well-developed and normal weight.   HENT:      Head: Normocephalic.      Right Ear: Hearing and external ear normal.      Left Ear: Hearing and external ear normal.      Nose: Nose normal.   Eyes:      Conjunctiva/sclera: Conjunctivae normal.   Cardiovascular:      Rate and Rhythm: Normal rate.   Pulmonary:      Effort: Pulmonary effort is normal.      Breath sounds: No wheezing or rales.   Abdominal:      Palpations: Abdomen is soft.      Tenderness: There is no abdominal tenderness.   Musculoskeletal:      Cervical back: Normal range of motion.   Skin:     Findings: No rash.   Neurological:      Mental Status: Patient is alert and oriented to person, place, and time.   Psychiatric:         Mood and Affect: Mood and affect normal.         Judgment: Judgment normal.     Ortho Exam   Right hand/wrist: Well-healed surgical incisions, mild tenderness about the surgical incisions, full range of motion of the wrist with flexion extension supination pronation, good ulnar and radial deviation.   strength symmetric and within normal limits bilateral " upper extremities.  Sensation light touch is intact, radial pulse 2+, cap refill less than 2 seconds, good thumb opposition. Negative Tinel's at the wrist.  Left hand/wrist: Tenderness about the wrist, skin intact without swelling.  No skin changes.  She has good flexion extension supination pronation although painful.   strength symmetric and within normal limits bilaterally, good range of motion of all digits including the thumb.  Cap refill less than 2 seconds.  Positive Tinel's at the wrist, positive compression testing.  Result Review :            Imaging Results (Most Recent)     None                Assessment and Plan    Problem List Items Addressed This Visit     None          Follow Up   No follow-ups on file.  There are no outpatient Patient Instructions on file for this admission.  Patient was given instructions and counseling regarding her condition or for health maintenance advice. Please see specific information pulled into the AVS if appropriate.     Risk benefits of surgery were explained, patient understands wished to proceed with carpal tunnel release

## 2021-10-10 NOTE — H&P (VIEW-ONLY)
"Chief Complaint  No chief complaint on file.    Subjective          Fabiana Wright presents to [facility] for follow-up on bilateral hand/wrist.  Patient had right carpal tunnel release performed by Dr. Greene 7/28/2021.  She states her pain and range of motion have greatly improved.  She states that she feels that she would be able to return to work if it were not for her left hand and wrist pain.  She has left carpal tunnel syndrome as well.  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  She is not currently using a carpal tunnel brace on the left upper extremity.    Objective   No Known Allergies    Vital Signs:   Ht 170.2 cm (67\")   Wt 88.5 kg (195 lb)   BMI 30.54 kg/m²       Physical Exam  Constitutional:       Appearance: Normal appearance. Patient is well-developed and normal weight.   HENT:      Head: Normocephalic.      Right Ear: Hearing and external ear normal.      Left Ear: Hearing and external ear normal.      Nose: Nose normal.   Eyes:      Conjunctiva/sclera: Conjunctivae normal.   Cardiovascular:      Rate and Rhythm: Normal rate.   Pulmonary:      Effort: Pulmonary effort is normal.      Breath sounds: No wheezing or rales.   Abdominal:      Palpations: Abdomen is soft.      Tenderness: There is no abdominal tenderness.   Musculoskeletal:      Cervical back: Normal range of motion.   Skin:     Findings: No rash.   Neurological:      Mental Status: Patient is alert and oriented to person, place, and time.   Psychiatric:         Mood and Affect: Mood and affect normal.         Judgment: Judgment normal.     Ortho Exam   Right hand/wrist: Well-healed surgical incisions, mild tenderness about the surgical incisions, full range of motion of the wrist with flexion extension supination pronation, good ulnar and radial deviation.   strength symmetric and within normal limits bilateral " upper extremities.  Sensation light touch is intact, radial pulse 2+, cap refill less than 2 seconds, good thumb opposition. Negative Tinel's at the wrist.  Left hand/wrist: Tenderness about the wrist, skin intact without swelling.  No skin changes.  She has good flexion extension supination pronation although painful.   strength symmetric and within normal limits bilaterally, good range of motion of all digits including the thumb.  Cap refill less than 2 seconds.  Positive Tinel's at the wrist, positive compression testing.  Result Review :            Imaging Results (Most Recent)     None                Assessment and Plan    Problem List Items Addressed This Visit     None          Follow Up   No follow-ups on file.  There are no outpatient Patient Instructions on file for this admission.  Patient was given instructions and counseling regarding her condition or for health maintenance advice. Please see specific information pulled into the AVS if appropriate.     Risk benefits of surgery were explained, patient understands wished to proceed with carpal tunnel release

## 2021-10-11 ENCOUNTER — HOSPITAL ENCOUNTER (OUTPATIENT)
Facility: HOSPITAL | Age: 56
Setting detail: HOSPITAL OUTPATIENT SURGERY
Discharge: HOME | End: 2021-10-11
Attending: ORTHOPAEDIC SURGERY | Admitting: ORTHOPAEDIC SURGERY

## 2021-10-11 ENCOUNTER — ANESTHESIA EVENT (OUTPATIENT)
Dept: PERIOP | Facility: HOSPITAL | Age: 56
End: 2021-10-11

## 2021-10-11 ENCOUNTER — ANESTHESIA (OUTPATIENT)
Dept: PERIOP | Facility: HOSPITAL | Age: 56
End: 2021-10-11

## 2021-10-11 VITALS
SYSTOLIC BLOOD PRESSURE: 138 MMHG | OXYGEN SATURATION: 98 % | BODY MASS INDEX: 31.21 KG/M2 | DIASTOLIC BLOOD PRESSURE: 83 MMHG | WEIGHT: 198.85 LBS | RESPIRATION RATE: 16 BRPM | TEMPERATURE: 97.2 F | HEART RATE: 64 BPM | HEIGHT: 67 IN

## 2021-10-11 DIAGNOSIS — G56.02 CARPAL TUNNEL SYNDROME OF LEFT WRIST: ICD-10-CM

## 2021-10-11 PROCEDURE — 25010000002 MIDAZOLAM PER 1MG: Performed by: ANESTHESIOLOGY

## 2021-10-11 PROCEDURE — 25010000002 PROPOFOL 10 MG/ML EMULSION: Performed by: NURSE ANESTHETIST, CERTIFIED REGISTERED

## 2021-10-11 PROCEDURE — 64721 CARPAL TUNNEL SURGERY: CPT | Performed by: ORTHOPAEDIC SURGERY

## 2021-10-11 PROCEDURE — 25010000003 CEFAZOLIN IN DEXTROSE 2-4 GM/100ML-% SOLUTION: Performed by: ORTHOPAEDIC SURGERY

## 2021-10-11 RX ORDER — LIDOCAINE HYDROCHLORIDE 5 MG/ML
INJECTION, SOLUTION INFILTRATION; INTRAVENOUS
Status: COMPLETED | OUTPATIENT
Start: 2021-10-11 | End: 2021-10-11

## 2021-10-11 RX ORDER — ACETAMINOPHEN 500 MG
1000 TABLET ORAL ONCE
Status: DISCONTINUED | OUTPATIENT
Start: 2021-10-11 | End: 2021-10-11 | Stop reason: SDUPTHER

## 2021-10-11 RX ORDER — CEFAZOLIN SODIUM IN 0.9 % NACL 3 G/100 ML
3 INTRAVENOUS SOLUTION, PIGGYBACK (ML) INTRAVENOUS ONCE
Status: DISCONTINUED | OUTPATIENT
Start: 2021-10-11 | End: 2021-10-11 | Stop reason: SDUPTHER

## 2021-10-11 RX ORDER — SODIUM CHLORIDE, SODIUM LACTATE, POTASSIUM CHLORIDE, CALCIUM CHLORIDE 600; 310; 30; 20 MG/100ML; MG/100ML; MG/100ML; MG/100ML
9 INJECTION, SOLUTION INTRAVENOUS CONTINUOUS PRN
Status: DISCONTINUED | OUTPATIENT
Start: 2021-10-11 | End: 2021-10-11 | Stop reason: HOSPADM

## 2021-10-11 RX ORDER — PROMETHAZINE HYDROCHLORIDE 12.5 MG/1
25 TABLET ORAL ONCE AS NEEDED
Status: DISCONTINUED | OUTPATIENT
Start: 2021-10-11 | End: 2021-10-11 | Stop reason: HOSPADM

## 2021-10-11 RX ORDER — PROMETHAZINE HYDROCHLORIDE 25 MG/1
25 SUPPOSITORY RECTAL ONCE AS NEEDED
Status: DISCONTINUED | OUTPATIENT
Start: 2021-10-11 | End: 2021-10-11 | Stop reason: HOSPADM

## 2021-10-11 RX ORDER — LIDOCAINE HYDROCHLORIDE 20 MG/ML
INJECTION, SOLUTION INFILTRATION; PERINEURAL AS NEEDED
Status: DISCONTINUED | OUTPATIENT
Start: 2021-10-11 | End: 2021-10-11 | Stop reason: SURG

## 2021-10-11 RX ORDER — BUPIVACAINE HYDROCHLORIDE 5 MG/ML
INJECTION, SOLUTION EPIDURAL; INTRACAUDAL AS NEEDED
Status: DISCONTINUED | OUTPATIENT
Start: 2021-10-11 | End: 2021-10-11 | Stop reason: HOSPADM

## 2021-10-11 RX ORDER — LIDOCAINE HYDROCHLORIDE 5 MG/ML
INJECTION, SOLUTION INFILTRATION; INTRAVENOUS AS NEEDED
Status: DISCONTINUED | OUTPATIENT
Start: 2021-10-11 | End: 2021-10-11 | Stop reason: SURG

## 2021-10-11 RX ORDER — CEFAZOLIN SODIUM 2 G/100ML
2 INJECTION, SOLUTION INTRAVENOUS ONCE
Status: COMPLETED | OUTPATIENT
Start: 2021-10-11 | End: 2021-10-11

## 2021-10-11 RX ORDER — MIDAZOLAM HYDROCHLORIDE 2 MG/2ML
2 INJECTION, SOLUTION INTRAMUSCULAR; INTRAVENOUS ONCE
Status: COMPLETED | OUTPATIENT
Start: 2021-10-11 | End: 2021-10-11

## 2021-10-11 RX ORDER — OXYCODONE HYDROCHLORIDE 5 MG/1
5 TABLET ORAL
Status: DISCONTINUED | OUTPATIENT
Start: 2021-10-11 | End: 2021-10-11 | Stop reason: HOSPADM

## 2021-10-11 RX ORDER — MEPERIDINE HYDROCHLORIDE 25 MG/ML
12.5 INJECTION INTRAMUSCULAR; INTRAVENOUS; SUBCUTANEOUS
Status: DISCONTINUED | OUTPATIENT
Start: 2021-10-11 | End: 2021-10-11 | Stop reason: HOSPADM

## 2021-10-11 RX ORDER — HYDROCODONE BITARTRATE AND ACETAMINOPHEN 7.5; 325 MG/1; MG/1
1 TABLET ORAL EVERY 4 HOURS PRN
Qty: 30 TABLET | Refills: 0 | Status: SHIPPED | OUTPATIENT
Start: 2021-10-11 | End: 2022-02-08

## 2021-10-11 RX ORDER — MIDAZOLAM HYDROCHLORIDE 2 MG/2ML
2 INJECTION, SOLUTION INTRAMUSCULAR; INTRAVENOUS ONCE
Status: DISCONTINUED | OUTPATIENT
Start: 2021-10-11 | End: 2021-10-11 | Stop reason: SDUPTHER

## 2021-10-11 RX ORDER — SODIUM CHLORIDE, SODIUM LACTATE, POTASSIUM CHLORIDE, CALCIUM CHLORIDE 600; 310; 30; 20 MG/100ML; MG/100ML; MG/100ML; MG/100ML
9 INJECTION, SOLUTION INTRAVENOUS CONTINUOUS PRN
Status: DISCONTINUED | OUTPATIENT
Start: 2021-10-11 | End: 2021-10-11 | Stop reason: SDUPTHER

## 2021-10-11 RX ORDER — HYDROCODONE BITARTRATE AND ACETAMINOPHEN 7.5; 325 MG/1; MG/1
1 TABLET ORAL ONCE AS NEEDED
Status: DISCONTINUED | OUTPATIENT
Start: 2021-10-11 | End: 2021-10-11 | Stop reason: HOSPADM

## 2021-10-11 RX ORDER — ONDANSETRON 2 MG/ML
4 INJECTION INTRAMUSCULAR; INTRAVENOUS ONCE AS NEEDED
Status: DISCONTINUED | OUTPATIENT
Start: 2021-10-11 | End: 2021-10-11 | Stop reason: HOSPADM

## 2021-10-11 RX ORDER — ACETAMINOPHEN 500 MG
1000 TABLET ORAL ONCE
Status: COMPLETED | OUTPATIENT
Start: 2021-10-11 | End: 2021-10-11

## 2021-10-11 RX ADMIN — LIDOCAINE HYDROCHLORIDE 40 ML: 5 INJECTION, SOLUTION INFILTRATION at 07:55

## 2021-10-11 RX ADMIN — SODIUM CHLORIDE, POTASSIUM CHLORIDE, SODIUM LACTATE AND CALCIUM CHLORIDE 9 ML/HR: 600; 310; 30; 20 INJECTION, SOLUTION INTRAVENOUS at 07:25

## 2021-10-11 RX ADMIN — PROPOFOL 150 MCG/KG/MIN: 10 INJECTION, EMULSION INTRAVENOUS at 07:56

## 2021-10-11 RX ADMIN — MIDAZOLAM HYDROCHLORIDE 2 MG: 1 INJECTION, SOLUTION INTRAMUSCULAR; INTRAVENOUS at 07:25

## 2021-10-11 RX ADMIN — LIDOCAINE HYDROCHLORIDE 40 ML: 5 INJECTION, SOLUTION INFILTRATION; INTRAVENOUS at 08:05

## 2021-10-11 RX ADMIN — ACETAMINOPHEN 1000 MG: 500 TABLET ORAL at 07:24

## 2021-10-11 RX ADMIN — LIDOCAINE HYDROCHLORIDE 25 MG: 20 INJECTION, SOLUTION INFILTRATION; PERINEURAL at 07:56

## 2021-10-11 RX ADMIN — CEFAZOLIN SODIUM 2 G: 2 INJECTION, SOLUTION INTRAVENOUS at 07:49

## 2021-10-11 NOTE — OP NOTE
CARPAL TUNNEL RELEASE  Procedure Report    Patient Name:  Fabiana Wright  YOB: 1965    Date of Surgery:  10/11/2021     Indications:  Left CTS    Pre-op Diagnosis:   Carpal tunnel syndrome of left wrist [G56.02]       Post-Op Diagnosis Codes:     * Carpal tunnel syndrome of left wrist [G56.02]    Procedure/CPT® Codes:      Procedure(s):  LEFT CARPAL TUNNEL RELEASE    Staff:  Surgeon(s):  Roxana Greene MD    Assistant: Ag Rodriguez    Anesthesia: Monitored Anesthesia Care    Estimated Blood Loss: none    Implants:    Nothing was implanted during the procedure    Specimen:          None        Findings: Carpal Tunnel Syndrome     Complications: None    Description of Procedure: The patient was brought to the operating room and underwent [place] block anesthesia without complication.  The patient received preoperative antibiotic and was then prepped and draped in sterile fashion.  Incision was made directly over the transverse carpal ligament and dissected down.  The ligament was identified and then opened using the #15 blade and then completed using tenotomy scissors.  Care was taken to avoid any neurovascular or tendinous structures and then checked with a Sullivan and a good release was achieved.  This was then thoroughly irrigated and closed using 4-0 nylon.  Half percent plain Marcaine was injected around the surgical site.  A sterile dressing was applied, followed by application of a splint.  The tourniquet was then deflated.  The patient was taken to the recovery room in stable condition.  There were no complications.    Assistant: Ag Rodriguez  was responsible for performing the following activities: Retraction, Suction, Irrigation and Placing Dressing and their skilled assistance was necessary for the success of this case.    Roxana Greene MD     Date: 10/11/2021  Time: 08:17 EDT

## 2021-10-11 NOTE — ANESTHESIA POSTPROCEDURE EVALUATION
Patient: Fabiana Wright    Procedure Summary     Date: 10/11/21 Room / Location: [facility] OR 01 / [facility] MA[facility] OR    Anesthesia Start: 0745 Anesthesia Stop: 0823    Procedure: CARPAL TUNNEL RELEASE (Left Wrist) Diagnosis:       Carpal tunnel syndrome of left wrist      (Carpal tunnel syndrome of left wrist [G56.02])    Surgeons: Roxana Greene MD Provider: Rell Lopez MD    Anesthesia Type: [place] block ASA Status: 2          Anesthesia Type: [place]enie block    Vitals  Vitals Value Taken Time   /86 10/11/21 0850   Temp 36.3 °C (97.3 °F) 10/11/21 0850   Pulse 61 10/11/21 0850   Resp 18 10/11/21 0850   SpO2 94 % 10/11/21 0850           Post Anesthesia Care and Evaluation    Patient location during evaluation: bedside  Patient participation: complete - patient participated  Level of consciousness: awake and alert  Pain management: adequate  Airway patency: patent  Anesthetic complications: No anesthetic complications  PONV Status: none  Cardiovascular status: acceptable  Respiratory status: acceptable  Hydration status: acceptable

## 2021-10-11 NOTE — ANESTHESIA PROCEDURE NOTES
Peripheral Block    Pre-sedation assessment completed: 10/11/2021 7:50 AM    Patient location during procedure: OR  Start time: 10/11/2021 7:55 AM  Stop time: 10/11/2021 7:56 AM  Reason for block: at surgeon's request and primary anesthetic  Performed by  CRNA: Denzel Barclay CRNA  Preanesthetic Checklist  Completed: patient identified, IV checked, site marked, risks and benefits discussed, surgical consent, monitors and equipment checked, pre-op evaluation and timeout performed  Prep:  Pt Position: supine  Patient monitoring: blood pressure monitoring, continuous pulse oximetry and EKG  Procedure  Sedation:yes  Performed under: local infiltration  Laterality:left  Block Type:sergio block  Injection Technique:single-shot    Medications Used: lidocaine PF (XYLOCAINE) 0.5 % injection, 40 mL      Post Assessment  Patient Tolerance:comfortable throughout block  Complications:no

## 2021-10-11 NOTE — ANESTHESIA PREPROCEDURE EVALUATION
Anesthesia Evaluation     Patient summary reviewed and Nursing notes reviewed   no history of anesthetic complications:  NPO Solid Status: > 8 hours  NPO Liquid Status: > 2 hours           Airway   Mallampati: I  TM distance: >3 FB  Neck ROM: full  No difficulty expected  Dental      Pulmonary - negative pulmonary ROS and normal exam    breath sounds clear to auscultation  Cardiovascular - normal exam  Exercise tolerance: good (4-7 METS)    Rhythm: regular    (+) hyperlipidemia,       Neuro/Psych  (+) headaches, numbness, psychiatric history,     GI/Hepatic/Renal/Endo - negative ROS     Musculoskeletal (-) negative ROS    Abdominal    Substance History - negative use     OB/GYN negative ob/gyn ROS         Other      history of cancer                    Anesthesia Plan    ASA 2     [place] block   (Patient understands anesthesia not responsible for dental damage.)  intravenous induction     Anesthetic plan, all risks, benefits, and alternatives have been provided, discussed and informed consent has been obtained with: patient.  Use of blood products discussed with patient .   Plan discussed with CRNA.

## 2021-10-26 ENCOUNTER — OFFICE VISIT (OUTPATIENT)
Dept: ORTHOPEDIC SURGERY | Facility: CLINIC | Age: 56
End: 2021-10-26

## 2021-10-26 VITALS — BODY MASS INDEX: 31.86 KG/M2 | WEIGHT: 203 LBS | HEIGHT: 67 IN | OXYGEN SATURATION: 97 % | HEART RATE: 85 BPM

## 2021-10-26 DIAGNOSIS — Z47.89 AFTERCARE FOLLOWING SURGERY OF THE MUSCULOSKELETAL SYSTEM: Primary | ICD-10-CM

## 2021-10-26 PROCEDURE — 99024 POSTOP FOLLOW-UP VISIT: CPT | Performed by: PHYSICIAN ASSISTANT

## 2021-10-26 NOTE — PROGRESS NOTES
"Chief Complaint  Pain of the Left Wrist    Subjective          Fabiana Wright presents to [facility] for s/p left carpal tunnel release performed by Dr. Greene on 10/11/2021.  Patient states she has been compliant with postop splint.  She states she did work in her digits while in the splint.  She states symptoms have improved from preoperatively.  She is pleased with her outcome.  Patient had right carpal tunnel release by Dr. Greene in July.    Objective   No Known Allergies    Vital Signs:   Pulse 85   Ht 170.2 cm (67\")   Wt 92.1 kg (203 lb)   SpO2 97%   BMI 31.79 kg/m²       Physical Exam  Constitutional:       Appearance: Normal appearance. Patient is well-developed and normal weight.   HENT:      Head: Normocephalic.      Right Ear: Hearing and external ear normal.      Left Ear: Hearing and external ear normal.      Nose: Nose normal.   Eyes:      Conjunctiva/sclera: Conjunctivae normal.   Cardiovascular:      Rate and Rhythm: Normal rate.   Pulmonary:      Effort: Pulmonary effort is normal.      Breath sounds: No wheezing or rales.   Abdominal:      Palpations: Abdomen is soft.      Tenderness: There is no abdominal tenderness.   Musculoskeletal:      Cervical back: Normal range of motion.   Skin:     Findings: No rash.   Neurological:      Mental Status: Patient is alert and oriented to person, place, and time.   Psychiatric:         Mood and Affect: Mood and affect normal.         Judgment: Judgment normal.     Ortho Exam  Left wrist: Incision well-healing no erythema or drainage.  Sutures removed.  Full range of motion of the digits, able to make a fist with good  strength.  Normal thumb opposition, abduction.  Sensation grossly intact neurovascular intact, radial and ulnar pulse 2+.    Result Review :   The following data was reviewed by: SUZI Green on 10/26/2021:         Imaging Results (Most Recent)     None                Assessment and Plan    Problem " List Items Addressed This Visit        Musculoskeletal and Injuries    Aftercare following surgery of the musculoskeletal system - Primary          Follow Up   Return in about 3 weeks (around 11/16/2021).  Patient Instructions   No heavy lifting, pushing or pulling  Keep incision clean, dry and uncovered   Continue with hand strengthening exercises of hand/digits, as demonstrated in clinic today.   Recommend use of brace during rest  Patient will remain off of work until next evaluation.    Patient was given instructions and counseling regarding her condition or for health maintenance advice. Please see specific information pulled into the AVS if appropriate.

## 2021-10-26 NOTE — PATIENT INSTRUCTIONS
No heavy lifting, pushing or pulling  Keep incision clean, dry and uncovered   Continue with hand strengthening exercises of hand/digits, as demonstrated in clinic today.   Recommend use of brace during rest  Patient will remain off of work until next evaluation.

## 2021-11-16 ENCOUNTER — OFFICE VISIT (OUTPATIENT)
Dept: ORTHOPEDIC SURGERY | Facility: CLINIC | Age: 56
End: 2021-11-16

## 2021-11-16 VITALS — HEIGHT: 67 IN | BODY MASS INDEX: 31.55 KG/M2 | WEIGHT: 201 LBS | HEART RATE: 75 BPM | OXYGEN SATURATION: 98 %

## 2021-11-16 DIAGNOSIS — Z47.89 AFTERCARE FOLLOWING SURGERY OF THE MUSCULOSKELETAL SYSTEM: Primary | ICD-10-CM

## 2021-11-16 PROCEDURE — 99024 POSTOP FOLLOW-UP VISIT: CPT | Performed by: PHYSICIAN ASSISTANT

## 2021-11-16 NOTE — PATIENT INSTRUCTIONS
Continue hand strengthening exercises and massage treatment for left wrist, as demonstrated in clinic.     Topical Voltaren prescribed today for left wrist.     Patient will call in future if she decided on hand therapy.

## 2021-11-16 NOTE — PROGRESS NOTES
"Chief Complaint  Pain of the Left Wrist    Subjective          Fabiana Wright presents to [facility] for s/p left carpal tunnel release performed by Dr. Greene on 10/11/2021.  Patient has been working on home exercises consisting of hand strengthening and range of motion exercises since her 2-week postop visit.  She states tenderness of her hand has improved.  She also states that strength of her hand is returning.  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  She states she feels ready to return to work at this time.    Objective   No Known Allergies    Vital Signs:   Pulse 75   Ht 170.2 cm (67\")   Wt 91.2 kg (201 lb)   SpO2 98%   BMI 31.48 kg/m²       Physical Exam  Constitutional:       Appearance: Normal appearance. Patient is well-developed and normal weight.   HENT:      Head: Normocephalic.      Right Ear: Hearing and external ear normal.      Left Ear: Hearing and external ear normal.      Nose: Nose normal.   Eyes:      Conjunctiva/sclera: Conjunctivae normal.   Cardiovascular:      Rate and Rhythm: Normal rate.   Pulmonary:      Effort: Pulmonary effort is normal.      Breath sounds: No wheezing or rales.   Abdominal:      Palpations: Abdomen is soft.      Tenderness: There is no abdominal tenderness.   Musculoskeletal:      Cervical back: Normal range of motion.   Skin:     Findings: No rash.   Neurological:      Mental Status: Patient is alert and oriented to person, place, and time.   Psychiatric:         Mood and Affect: Mood and affect normal.         Judgment: Judgment normal.     Ortho Exam  Left hand: Tenderness of proximal scar, otherwise supple and well-healed surgical incision.  Full wrist flexion and extension.  Full range of motion of the digits.  Normal thumb opposition and abduction.  Able to make a fist with good  strength.  Good muscle tone of wrist flexors and extensors.  Sensation grossly intact, " neurovascular intact, radial and ulnar pulse are 2+.    Result Review :   The following data was reviewed by: SUZI Green on 11/16/2021:         Imaging Results (Most Recent)     None                Assessment and Plan    Problem List Items Addressed This Visit        Musculoskeletal and Injuries    Aftercare following surgery of the musculoskeletal system - Primary          Follow Up   Return if symptoms worsen or fail to improve.  Patient Instructions   Continue hand strengthening exercises and massage treatment for left wrist, as demonstrated in clinic.     Topical Voltaren prescribed today for left wrist.     Patient will call in future if she decided on hand therapy.     Patient was given instructions and counseling regarding her condition or for health maintenance advice. Please see specific information pulled into the AVS if appropriate.

## 2022-02-08 ENCOUNTER — OFFICE VISIT (OUTPATIENT)
Dept: FAMILY MEDICINE CLINIC | Facility: CLINIC | Age: 57
End: 2022-02-08

## 2022-02-08 VITALS
DIASTOLIC BLOOD PRESSURE: 88 MMHG | HEART RATE: 84 BPM | OXYGEN SATURATION: 97 % | BODY MASS INDEX: 31.74 KG/M2 | WEIGHT: 202.2 LBS | HEIGHT: 67 IN | SYSTOLIC BLOOD PRESSURE: 154 MMHG

## 2022-02-08 DIAGNOSIS — Z12.31 ENCOUNTER FOR SCREENING MAMMOGRAM FOR MALIGNANT NEOPLASM OF BREAST: ICD-10-CM

## 2022-02-08 DIAGNOSIS — E66.09 CLASS 1 OBESITY DUE TO EXCESS CALORIES WITH SERIOUS COMORBIDITY AND BODY MASS INDEX (BMI) OF 31.0 TO 31.9 IN ADULT: Chronic | ICD-10-CM

## 2022-02-08 DIAGNOSIS — G56.01 CARPAL TUNNEL SYNDROME OF RIGHT WRIST: ICD-10-CM

## 2022-02-08 DIAGNOSIS — Z12.11 SCREEN FOR COLON CANCER: ICD-10-CM

## 2022-02-08 DIAGNOSIS — Z13.89 SCREENING FOR BLOOD OR PROTEIN IN URINE: ICD-10-CM

## 2022-02-08 DIAGNOSIS — E03.9 HYPOTHYROIDISM, UNSPECIFIED TYPE: Primary | Chronic | ICD-10-CM

## 2022-02-08 DIAGNOSIS — F17.200 NICOTINE DEPENDENCE WITH CURRENT USE: Chronic | ICD-10-CM

## 2022-02-08 DIAGNOSIS — E55.9 VITAMIN D DEFICIENCY: ICD-10-CM

## 2022-02-08 DIAGNOSIS — Z13.220 SCREENING FOR LIPID DISORDERS: ICD-10-CM

## 2022-02-08 PROBLEM — E66.811 CLASS 1 OBESITY DUE TO EXCESS CALORIES WITH SERIOUS COMORBIDITY AND BODY MASS INDEX (BMI) OF 31.0 TO 31.9 IN ADULT: Chronic | Status: ACTIVE | Noted: 2022-02-08

## 2022-02-08 PROCEDURE — 99214 OFFICE O/P EST MOD 30 MIN: CPT | Performed by: PHYSICIAN ASSISTANT

## 2022-02-08 RX ORDER — LEVOTHYROXINE SODIUM 0.12 MG/1
125 TABLET ORAL DAILY
Qty: 90 TABLET | Refills: 1 | Status: SHIPPED | OUTPATIENT
Start: 2022-02-08 | End: 2022-08-11

## 2022-02-08 RX ORDER — ERGOCALCIFEROL (VITAMIN D2) 10 MCG
400 TABLET ORAL DAILY
COMMUNITY

## 2022-02-08 RX ORDER — ALBUTEROL SULFATE 90 UG/1
2 AEROSOL, METERED RESPIRATORY (INHALATION)
COMMUNITY
Start: 2021-12-03

## 2022-02-08 NOTE — PROGRESS NOTES
Chief Complaint  Hypothyroidism (6 month follow up) and Vitamin D Deficiency    Subjective          Fabiana Wright presents to [facility]  History of Present Illness  Pt presents today for 6 month follow up.    Pt has no current issues.  She is s/p bilat CTS repair.    Pt needs refills on synthroid - hypothyroidism    Pt is feeling good overall has some right palm pain, but using voltaren gel which has helped.    Past Medical History:   Diagnosis Date   • Anxiety    • Carpal tunnel syndrome of left wrist    • Carpal tunnel syndrome on right    • Cervical radiculitis 01/16/2014   • Depression    • HLD (hyperlipidemia) 09/27/2016,11/06/2009   • Hypothyroidism 04/01/2015   • Lumbar radiculitis 01/16/2014   • Malignant neoplasm of thyroid gland (HCC) 11/03/2009   • Migraine headache    • Muscle spasm 01/21/2010   • Nicotine dependence 09/27/016   • Vitamin D deficiency 09/28/2018      Family History   Problem Relation Age of Onset   • Heart disease Other         UNSPECIFIED   • Cancer Other         UNSPECIFIED   • Malig Hyperthermia Neg Hx       Past Surgical History:   Procedure Laterality Date   • ABDOMINAL SURGERY      tumor removed from stomach    • CARPAL TUNNEL RELEASE Right 7/28/2021    Procedure: CARPAL TUNNEL RELEASE;  Surgeon: Roxana Greene MD;  Location: [facility];  Service: Orthopedics;  Laterality: Right;   • CARPAL TUNNEL RELEASE Left 10/11/2021    Procedure: CARPAL TUNNEL RELEASE;  Surgeon: Roxana Greene MD;  Location: [facility];  Service: Orthopedics;  Laterality: Left;   • HYSTERECTOMY  2011   • THYROIDECTOMY  01/21/2010   • TUBAL ABDOMINAL LIGATION          Current Outpatient Medications:   •  albuterol sulfate  (90 Base) MCG/ACT inhaler, Inhale 2 puffs., Disp: , Rfl:   •  Calcium Carbonate 1500 (600 Ca) MG tablet, Take 600 mg by mouth 2 (Two) Times a Day With Meals., Disp: , Rfl:   •  Diclofenac Sodium (VOLTAREN) 1 % gel gel, Apply 4 g  "topically to the appropriate area as directed 4 (Four) Times a Day As Needed (as needed)., Disp: 100 g, Rfl: 1  •  levothyroxine (Euthyrox) 125 MCG tablet, Take 1 tablet by mouth Daily., Disp: 90 tablet, Rfl: 1  •  Vitamin D, Cholecalciferol, (CHOLECALCIFEROL) 10 MCG (400 UNIT) tablet, Take 400 Units by mouth Daily., Disp: , Rfl:     Objective     Vital Signs:     /88 (BP Location: Left arm, Patient Position: Sitting, Cuff Size: Adult)   Pulse 84   Ht 170.2 cm (67\")   Wt 91.7 kg (202 lb 3.2 oz)   SpO2 97%   BMI 31.67 kg/m²    Estimated body mass index is 31.67 kg/m² as calculated from the following:    Height as of this encounter: 170.2 cm (67\").    Weight as of this encounter: 91.7 kg (202 lb 3.2 oz).     Wt Readings from Last 3 Encounters:   02/08/22 91.7 kg (202 lb 3.2 oz)   11/16/21 91.2 kg (201 lb)   10/26/21 92.1 kg (203 lb)     BP Readings from Last 3 Encounters:   02/08/22 154/88   10/11/21 138/83   08/05/21 131/74     Physical Exam  Vitals and nursing note reviewed.   Constitutional:       Appearance: Normal appearance. She is obese.   HENT:      Head: Normocephalic and atraumatic.   Cardiovascular:      Rate and Rhythm: Normal rate and regular rhythm.   Pulmonary:      Effort: Pulmonary effort is normal.      Breath sounds: Normal breath sounds.   Musculoskeletal:      Cervical back: Neck supple.   Neurological:      Mental Status: She is alert.   Psychiatric:         Mood and Affect: Mood normal.         Behavior: Behavior normal.        Result Review :     Common labs    Common Labsle 3/5/21   Glucose 92   BUN 10   Creatinine 0.90   Sodium 144   Potassium 3.6   Chloride 102   Calcium 9.1   Albumin 4.4   Total Bilirubin 0.24   Alkaline Phosphatase 75   AST (SGOT) 16   ALT (SGPT) 18   WBC 9.56   Hemoglobin 14.8   Hematocrit 45.6   Platelets 316   Total Cholesterol 265 (A)   Triglycerides 356 (A)   HDL Cholesterol 38 (A)   LDL Cholesterol  156 (A)   (A) Abnormal value       Comments are available " for some flowsheets but are not being displayed.                         Assessment and Plan      Diagnoses and all orders for this visit:    1. Hypothyroidism, unspecified type (Primary)  Comments:  Stable on synthroid 125mcg daily  Orders:  -     TSH; Future  -     T4, Free; Future  -     levothyroxine (Euthyrox) 125 MCG tablet; Take 1 tablet by mouth Daily.  Dispense: 90 tablet; Refill: 1    2. Vitamin D deficiency  -     Vitamin D 25 Hydroxy; Future    3. Screening for lipid disorders  -     Lipid Panel; Future  -     CBC & Differential; Future  -     Comprehensive Metabolic Panel; Future    4. Screening for blood or protein in urine  -     Urinalysis With Culture If Indicated -; Future    5. Encounter for screening mammogram for malignant neoplasm of breast  -     Mammo Screening Digital Tomosynthesis Bilateral With CAD    6. Screen for colon cancer  -     Cologuard - Stool, Per Rectum; Future    7. Class 1 obesity due to excess calories with serious comorbidity and body mass index (BMI) of 31.0 to 31.9 in adult  Comments:  Disc diet and exercise  Overview:  Disc diet and exercise      8. Carpal tunnel syndrome of right wrist  Overview:  Some pain cont in right palm, but stable using voltaren gel      9. Nicotine dependence with current use  Comments:  Smokes cigs daily     Follow Up     Return in about 6 months (around 8/8/2022).    Patient was given instructions and counseling regarding her condition or for health maintenance advice. Please see specific information pulled into the AVS if appropriate.     I have reviewed information obtained and documented by others and I have confirmed the accuracy of this documented note.    SUZI Bright

## 2022-02-09 PROBLEM — F17.200 NICOTINE DEPENDENCE WITH CURRENT USE: Chronic | Status: ACTIVE | Noted: 2022-02-09

## 2022-04-08 ENCOUNTER — HOSPITAL ENCOUNTER (OUTPATIENT)
Dept: MAMMOGRAPHY | Facility: HOSPITAL | Age: 57
Discharge: HOME | End: 2022-04-08
Admitting: PHYSICIAN ASSISTANT

## 2022-04-08 ENCOUNTER — TELEPHONE (OUTPATIENT)
Dept: FAMILY MEDICINE CLINIC | Facility: CLINIC | Age: 57
End: 2022-04-08

## 2022-04-08 PROCEDURE — 77067 SCR MAMMO BI INCL CAD: CPT

## 2022-04-08 PROCEDURE — 77063 BREAST TOMOSYNTHESIS BI: CPT

## 2022-04-08 NOTE — TELEPHONE ENCOUNTER
----- Message from SUZI Bright sent at 4/8/2022  8:57 AM EDT -----  Your mammogram did not show evidence of breast cancer.  Routine follow up yearly for mammograms is recommended.  Remember to perform your monthly self breast exams (SBE).  Notify me immediately if you know notice any abnormalities.

## 2022-08-11 DIAGNOSIS — E03.9 HYPOTHYROIDISM, UNSPECIFIED TYPE: Chronic | ICD-10-CM

## 2022-08-11 RX ORDER — LEVOTHYROXINE SODIUM 0.12 MG/1
TABLET ORAL
Qty: 90 TABLET | Refills: 0 | Status: SHIPPED | OUTPATIENT
Start: 2022-08-11

## 2022-08-26 ENCOUNTER — TRANSCRIBE ORDERS (OUTPATIENT)
Dept: ADMINISTRATIVE | Facility: HOSPITAL | Age: 57
End: 2022-08-26

## 2022-08-26 DIAGNOSIS — Z12.2 SCREENING FOR LUNG CANCER: Primary | ICD-10-CM

## 2022-09-23 ENCOUNTER — APPOINTMENT (OUTPATIENT)
Dept: CT IMAGING | Facility: HOSPITAL | Age: 57
End: 2022-09-23

## 2025-01-03 ENCOUNTER — TRANSCRIBE ORDERS (OUTPATIENT)
Dept: ADMINISTRATIVE | Facility: HOSPITAL | Age: 60
End: 2025-01-03
Payer: COMMERCIAL

## 2025-01-03 DIAGNOSIS — Z12.31 OTHER SCREENING MAMMOGRAM: Primary | ICD-10-CM

## 2025-01-22 ENCOUNTER — TRANSCRIBE ORDERS (OUTPATIENT)
Dept: ADMINISTRATIVE | Facility: HOSPITAL | Age: 60
End: 2025-01-22
Payer: COMMERCIAL

## 2025-01-22 DIAGNOSIS — F17.210 CIGARETTE SMOKER: Primary | ICD-10-CM

## 2025-02-07 ENCOUNTER — HOSPITAL ENCOUNTER (OUTPATIENT)
Dept: CT IMAGING | Facility: HOSPITAL | Age: 60
Discharge: HOME | End: 2025-02-07
Admitting: NURSE PRACTITIONER
Payer: COMMERCIAL

## 2025-02-07 DIAGNOSIS — F17.210 CIGARETTE SMOKER: ICD-10-CM

## 2025-02-07 PROCEDURE — 71271 CT THORAX LUNG CANCER SCR C-: CPT

## 2025-02-24 ENCOUNTER — HOSPITAL ENCOUNTER (OUTPATIENT)
Dept: MAMMOGRAPHY | Facility: HOSPITAL | Age: 60
Discharge: HOME | End: 2025-02-24
Admitting: NURSE PRACTITIONER
Payer: COMMERCIAL

## 2025-02-24 DIAGNOSIS — Z12.31 OTHER SCREENING MAMMOGRAM: ICD-10-CM

## 2025-02-24 PROCEDURE — 77067 SCR MAMMO BI INCL CAD: CPT

## 2025-02-24 PROCEDURE — 77063 BREAST TOMOSYNTHESIS BI: CPT

## (undated) DEVICE — GLV SURG SENSICARE W/ALOE PF LF 7 STRL

## (undated) DEVICE — VAGINAL PREP TRAY: Brand: MEDLINE INDUSTRIES, INC.

## (undated) DEVICE — NDL HYPO ECLPS SFTY 22G 1IN

## (undated) DEVICE — GLV SURG SENSICARE SLT PF LF 7 STRL

## (undated) DEVICE — GLV SURG SENSICARE SLT PF LF 8.5 STRL

## (undated) DEVICE — GAUZE,SPONGE,4"X4",16PLY,STRL,LF,10/TRAY: Brand: MEDLINE

## (undated) DEVICE — UNDERCAST PADDING: Brand: DEROYAL

## (undated) DEVICE — INTENDED FOR TISSUE SEPARATION, AND OTHER PROCEDURES THAT REQUIRE A SHARP SURGICAL BLADE TO PUNCTURE OR CUT.: Brand: BARD-PARKER ® CARBON RIB-BACK BLADES

## (undated) DEVICE — SUT ETHLN 4/0 FS2 18IN 662H

## (undated) DEVICE — GLV SURG BIOGEL LTX PF 8 1/2

## (undated) DEVICE — BNDG ELAS DELUXE REINF 10CM 5MTR STRL

## (undated) DEVICE — DRSNG WND GZ CURAD OIL EMULSION 3X3IN STRL

## (undated) DEVICE — BNDG ESMARK 4IN 12FT LF STRL BLU

## (undated) DEVICE — EXTREMITY-LF: Brand: MEDLINE INDUSTRIES, INC.